# Patient Record
Sex: FEMALE | Race: WHITE | Employment: OTHER | URBAN - METROPOLITAN AREA
[De-identification: names, ages, dates, MRNs, and addresses within clinical notes are randomized per-mention and may not be internally consistent; named-entity substitution may affect disease eponyms.]

---

## 2018-05-12 LAB — MAMMOGRAM: NORMAL

## 2019-05-13 ENCOUNTER — TRANSFERRED RECORDS (OUTPATIENT)
Dept: HEALTH INFORMATION MANAGEMENT | Facility: CLINIC | Age: 69
End: 2019-05-13

## 2019-06-18 ENCOUNTER — TRANSFERRED RECORDS (OUTPATIENT)
Dept: HEALTH INFORMATION MANAGEMENT | Facility: CLINIC | Age: 69
End: 2019-06-18

## 2020-02-06 ENCOUNTER — TRANSCRIBE ORDERS (OUTPATIENT)
Dept: OTHER | Age: 70
End: 2020-02-06

## 2020-02-06 DIAGNOSIS — M50.30 DDD (DEGENERATIVE DISC DISEASE), CERVICAL: Primary | ICD-10-CM

## 2020-02-06 DIAGNOSIS — M54.2 CERVICAL PAIN: ICD-10-CM

## 2020-02-10 ENCOUNTER — TELEPHONE (OUTPATIENT)
Dept: ORTHOPEDICS | Facility: CLINIC | Age: 70
End: 2020-02-10

## 2020-02-10 NOTE — TELEPHONE ENCOUNTER
INTAKE QUESTIONS FOR SPINE AND NECK                                                                     REASON FOR VISIT:DDD (degenerative disc disease), cervical [M50.30];Cervical pain [M54.2] Referred by [facility]     APPOINTMENT DATE: 02/25/2020   HAVE YOU HAD PREVIOUS SURGERIES ON YOUR NECK OR SPINE: No  WHERE? n/a  WHEN? n/a      HAVE YOU HAD RELATED IMAGING?   (BONE SCANS, XRAYS, CAT SCANS, MRIS) MRI    WHERE? [facility]    WHEN? Last week   HAVE YOU HAD INJECTIONS TO THE SPINE? Yes    WHERE? [facility]    WHEN? April   HAVE YOU HAD RELATED PHYSICAL THERAPY IN THE LAST YEAR? Yes    WHERE? [facility] and also dry needle accupunture   DO YOU HAVE ANY RELATED IMPLANTS OR HARDWARE? no   DO YOU HAVE ANY PATHOLOGY REPORTS RELATED TO YOUR SPINE OR NECK? no     CSS NOTES STATUS DETAILS   OFFICE NOTE from referring provider In process    OFFICE NOTE from other specialist In process    PHYSICAL THERAPY (WITHIN LAST YEAR) In process    DISCHARGE SUMMARY from hospital In process    DISCHARGE REPORT from the ER In process    OPERATIVE REPORT In process    MEDICATION LIST In process    LABS/CBC/DIFF In process    CULTURES In process    IMPLANT RECORD/STICKER In process    IMAGING     INJECTIONS DONE IN RADIOLOGY In process    MRI In process    CT SCAN In process    XRAYS (IMAGES & REPORTS) In process    TUMOR     PATHOLOGY  Slides & report In process

## 2020-02-11 ENCOUNTER — DOCUMENTATION ONLY (OUTPATIENT)
Dept: CARE COORDINATION | Facility: CLINIC | Age: 70
End: 2020-02-11

## 2020-02-13 DIAGNOSIS — M54.2 CERVICAL PAIN: Primary | ICD-10-CM

## 2020-02-25 ENCOUNTER — OFFICE VISIT (OUTPATIENT)
Dept: ORTHOPEDICS | Facility: CLINIC | Age: 70
End: 2020-02-25
Attending: PHYSICAL MEDICINE & REHABILITATION
Payer: MEDICARE

## 2020-02-25 ENCOUNTER — ANCILLARY PROCEDURE (OUTPATIENT)
Dept: GENERAL RADIOLOGY | Facility: CLINIC | Age: 70
End: 2020-02-25
Attending: ORTHOPAEDIC SURGERY
Payer: MEDICARE

## 2020-02-25 VITALS — HEIGHT: 62 IN | WEIGHT: 130 LBS | BODY MASS INDEX: 23.92 KG/M2

## 2020-02-25 DIAGNOSIS — M54.2 NECK PAIN: Primary | ICD-10-CM

## 2020-02-25 RX ORDER — ATORVASTATIN CALCIUM 10 MG/1
10 TABLET, FILM COATED ORAL DAILY
COMMUNITY
Start: 2020-01-21

## 2020-02-25 RX ORDER — GABAPENTIN 100 MG/1
100 CAPSULE ORAL 3 TIMES DAILY PRN
Qty: 90 CAPSULE | Refills: 0 | Status: SHIPPED | OUTPATIENT
Start: 2020-02-25 | End: 2020-04-14

## 2020-02-25 RX ORDER — LOVASTATIN 20 MG
TABLET ORAL
COMMUNITY
Start: 2019-10-16 | End: 2020-02-25

## 2020-02-25 RX ORDER — POTASSIUM CHLORIDE 750 MG/1
10 TABLET, EXTENDED RELEASE ORAL DAILY
COMMUNITY
Start: 2020-02-08

## 2020-02-25 ASSESSMENT — ENCOUNTER SYMPTOMS
NECK MASS: 0
LEG PAIN: 0
EXERCISE INTOLERANCE: 0
PALPITATIONS: 0
SORE THROAT: 0
SINUS CONGESTION: 0
SINUS PAIN: 0
SLEEP DISTURBANCES DUE TO BREATHING: 0
HYPERTENSION: 1
ORTHOPNEA: 0
HOARSE VOICE: 0
TROUBLE SWALLOWING: 0
TASTE DISTURBANCE: 0
LIGHT-HEADEDNESS: 0
HYPOTENSION: 0
SMELL DISTURBANCE: 0

## 2020-02-25 ASSESSMENT — MIFFLIN-ST. JEOR: SCORE: 1057.93

## 2020-02-25 NOTE — PROGRESS NOTES
"Spine Surgery Consultation    REFERRING PHYSICIAN: Elza Mauro   PRIMARY CARE PHYSICIAN: So Carroll           Chief Complaint:   Consult (cervical pain )      History of Present Illness:  Symptom Profile Including: location of symptoms, onset, severity, exacerbating/alleviating factors, previous treatments:        Loli Roberson is a 69 year old female who presents today for evaluation of arthritic type neck pain.  She is been dealing with it for about a year and a half.  She denies any radicular or myelopathic complaints.  No issues with the hands or legs.  No bowel or bladder problems.  No gait instability.  She feels pain turning her neck to the side and a cracking and popping in her neck.  She takes some Tylenol for it.  She was offered a muscle relaxant but it made her feel quite sedated so she is not able to continue it.  She is done physical therapy and acupuncture without much relief.         Past Medical History:   No past medical history on file.         Past Surgical History:   No past surgical history on file.         Social History:     Social History     Tobacco Use     Smoking status: Never Smoker     Smokeless tobacco: Never Used   Substance Use Topics     Alcohol use: Not on file            Family History:   No family history on file.         Allergies:   No Known Allergies         Medications:     Current Outpatient Medications   Medication     atorvastatin (LIPITOR) 10 MG tablet     potassium chloride ER (K-TAB/KLOR-CON) 10 MEQ CR tablet     No current facility-administered medications for this visit.              Review of Systems:     A 10 point ROS was performed and reviewed. Specific responses to these questions are noted at the end of the document.         Physical Exam:   Vitals: Ht 1.575 m (5' 2\")   Wt 59 kg (130 lb)   BMI 23.78 kg/m    Constitutional: awake, alert, cooperative, no apparent distress, appears stated age.    Eyes: The sclera are white.  Ears, Nose, Throat: The " trachea is midline.  Psychiatric: The patient has a normal affect.  Respiratory: breathing non-labored  Cardiovascular: The extremities are warm and perfused.  Skin: no obvious rashes or lesions.  Musculoskeletal, Neurologic, and Spine:     Cervical spine:    Appearance -no gross step-offs, kyphosis.    Motor -     C5: Deltoids R 5/5 and L 5/5 strength    C6: Biceps R 5/5 and L 5/5 strength     C7: Triceps R 5/5 and L 5/5 strength     C8:  R 5/5 and L 5/5 strength     T1: Dorsal interossei R 4/5 and L 4/5 strength        Sensation: intact to light touch in C5-T1      Special Tests -      Lhermitte's Test - Negative     Spurling's Test - produces neck pain     Lopez's Test - Negative          Neurologic:      REFLEXES Left Right   Biceps 1+ 1+   Triceps 1+ 1+   Brachioradialis 1+ 1+             Babinski No upgoing great toe No upgoing great toe   Clonus 0 beats 0 beats     Hip Exam:  No pain with hip log roll and no tenderness over the greater trochanters.    Alignment:  Patient stands with a neutral standing sagittal balance.           Imaging:   We ordered and independently reviewed new radiographs at this clinic visit. The results were discussed with the patient.  Findings include:    AP lateral flexion-extension cervical radiographs show multilevel degenerative changes on flexion views there is grade 1 anterolisthesis of C7 on T1 February 4, 2020 cervical MRI again shows multilevel cervical degenerative changes with disc osteophyte complexes resulting in mild to moderate narrowing C3-4 C4-5 C5-6 centrally.  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  She is dealing with severe neck pain but has no neurologic complaints.  There is no sagittal imbalance and no dynamic instability.     Plan:  1. I counseled her that with this type of spondylosis in the neck, with the extent of it, it would require a longer fusion construct to try and address it.  This is a major surgery.  I explained that in the absence of deformity or significant instability that I would not recommend this.  Furthermore she has no neurologic symptoms at this time and I think a long fusion construct has a chance of making her worse rather than better so I have recommended against it at this time.  2. I recommended she try to maximize nonoperative care in the form of facet injections, and that she consider radiofrequency ablations.  She follows with the pain clinic in [place] and she is going to discuss this with them.  I also recommended regular anti-inflammatories and I offered her a prescription for gabapentin to see if that would help her as well.  I explained that this can produce sedation similar to a muscle relaxant so we agreed to start at a very low dose.  She can discuss tapering up on this with the pain clinic over time as long as she does not have side effects.  3. I asked her to contact me in the future if she develops any neurologic symptoms or numbness or clumsiness in the hands.  Or if she has shooting pain down into the arms consistent with radiculopathy.  If this does develop, then perhaps surgery might be a more reasonable consideration, but I encouraged her to make a long fusion for neck pain alone to be a last resort.      Respectfully,  José Ellis MD  Spine Surgery  [facility]      Answers for HPI/ROS submitted by the patient on 2/25/2020   General Symptoms: No  Skin Symptoms: No  HENT Symptoms: Yes  EYE SYMPTOMS: No  HEART SYMPTOMS: Yes  LUNG SYMPTOMS: No  INTESTINAL SYMPTOMS: No  URINARY SYMPTOMS:  No  GYNECOLOGIC SYMPTOMS: No  BREAST SYMPTOMS: No  SKELETAL SYMPTOMS: No  BLOOD SYMPTOMS: No  NERVOUS SYSTEM SYMPTOMS: No  MENTAL HEALTH SYMPTOMS: No  Ear pain: Yes  Ear discharge: Yes  Hearing loss: Yes  Tinnitus: Yes  Nosebleeds: No  Congestion: No  Sinus pain: No  Trouble swallowing: No   Voice hoarseness: No  Mouth sores: No  Sore throat: No  Tooth pain: No  Gum tenderness: No  Bleeding gums: No  Change in taste: No  Change in sense of smell: No  Dry mouth: No  Hearing aid used: No  Neck lump: No  Chest pain or pressure: No  Fast or irregular heartbeat: No  Pain in legs with walking: No  Trouble breathing while lying down: No  Fingers or toes appear blue: No  High blood pressure: Yes  Low blood pressure: No  Murmurs: No  Pacemaker: No  Varicose veins: No  Edema or swelling: No  Wake up at night with shortness of breath: No  Light-headedness: No  Exercise intolerance: No

## 2020-02-25 NOTE — NURSING NOTE
"Reason For Visit:   Chief Complaint   Patient presents with     Consult     cervical pain        Primary MD: So Carroll  Ref. MD: John   Date of surgery: none   Type of surgery: none .  Smoker: No  Request smoking cessation information: No    Ht 1.575 m (5' 2\")   Wt 59 kg (130 lb)   BMI 23.78 kg/m           Oswestry (LUBA) Questionnaire    No flowsheet data found.         Neck Disability Index (NDI) Questionnaire    Neck Disability Index (NDI) 2/25/2020   Neck Disability Index: Count 10   NDI: Total Score = SUM (points for all 10 findings) 22   Neck Disability in Percent = (Total Score) / 50 * 100 44 (%)                   Promis 10 Assessment    PROMIS 10 2/25/2020   In general, would you say your health is: Good   In general, would you say your quality of life is: Good   In general, how would you rate your physical health? Fair   In general, how would you rate your mental health, including your mood and your ability to think? Good   In general, how would you rate your satisfaction with your social activities and relationships? Good   In general, please rate how well you carry out your usual social activities and roles Good   To what extent are you able to carry out your everyday physical activities such as walking, climbing stairs, carrying groceries, or moving a chair? Moderately   How often have you been bothered by emotional problems such as feeling anxious, depressed or irritable? Never   How would you rate your fatigue on average? Mild   How would you rate your pain on average?   0 = No Pain  to  10 = Worst Imaginable Pain 6   In general, would you say your health is: 3   In general, would you say your quality of life is: 3   In general, how would you rate your physical health? 2   In general, how would you rate your mental health, including your mood and your ability to think? 3   In general, how would you rate your satisfaction with your social activities and relationships? 3   In general, " please rate how well you carry out your usual social activities and roles. (This includes activities at home, at work and in your community, and responsibilities as a parent, child, spouse, employee, friend, etc.) 3   To what extent are you able to carry out your everyday physical activities such as walking, climbing stairs, carrying groceries, or moving a chair? 3   In the past 7 days, how often have you been bothered by emotional problems such as feeling anxious, depressed, or irritable? 1   In the past 7 days, how would you rate your fatigue on average? 2   In the past 7 days, how would you rate your pain on average, where 0 means no pain, and 10 means worst imaginable pain? 6   Global Mental Health Score 14   Global Physical Health Score 12   PROMIS TOTAL - SUBSCORES 26                Oscar Zaragoza ATC

## 2020-03-04 ENCOUNTER — TELEPHONE (OUTPATIENT)
Dept: ANESTHESIOLOGY | Facility: CLINIC | Age: 70
End: 2020-03-04

## 2020-03-04 NOTE — TELEPHONE ENCOUNTER
Dr. Porras reviewed this patients referral and determined the pt was HP.   LPN called to offer appointment with the clinic- Pt was unavailable with Dr. Porras on 3/5/20.     Pt assisted to schedule with Dr. Briscoe.     Shellie Verdugo LPN

## 2020-03-05 ASSESSMENT — ENCOUNTER SYMPTOMS
TROUBLE SWALLOWING: 0
NECK MASS: 0
SMELL DISTURBANCE: 0
HOARSE VOICE: 0
TASTE DISTURBANCE: 0
SORE THROAT: 0
SINUS PAIN: 0
SINUS CONGESTION: 0

## 2020-03-05 ASSESSMENT — ANXIETY QUESTIONNAIRES
1. FEELING NERVOUS, ANXIOUS, OR ON EDGE: NOT AT ALL
2. NOT BEING ABLE TO STOP OR CONTROL WORRYING: NOT AT ALL
GAD7 TOTAL SCORE: 0
GAD7 TOTAL SCORE: 0
5. BEING SO RESTLESS THAT IT IS HARD TO SIT STILL: NOT AT ALL
3. WORRYING TOO MUCH ABOUT DIFFERENT THINGS: NOT AT ALL
7. FEELING AFRAID AS IF SOMETHING AWFUL MIGHT HAPPEN: NOT AT ALL
4. TROUBLE RELAXING: NOT AT ALL
6. BECOMING EASILY ANNOYED OR IRRITABLE: NOT AT ALL
7. FEELING AFRAID AS IF SOMETHING AWFUL MIGHT HAPPEN: NOT AT ALL

## 2020-03-06 ENCOUNTER — OFFICE VISIT (OUTPATIENT)
Dept: ANESTHESIOLOGY | Facility: CLINIC | Age: 70
End: 2020-03-06
Payer: MEDICARE

## 2020-03-06 ENCOUNTER — HOSPITAL ENCOUNTER (OUTPATIENT)
Facility: AMBULATORY SURGERY CENTER | Age: 70
End: 2020-03-06
Attending: ANESTHESIOLOGY
Payer: MEDICARE

## 2020-03-06 VITALS
HEIGHT: 62 IN | BODY MASS INDEX: 23.92 KG/M2 | RESPIRATION RATE: 19 BRPM | OXYGEN SATURATION: 98 % | WEIGHT: 130 LBS | HEART RATE: 91 BPM

## 2020-03-06 DIAGNOSIS — M47.812 CERVICAL SPONDYLOSIS: ICD-10-CM

## 2020-03-06 DIAGNOSIS — M79.18 MYOFASCIAL PAIN: Primary | ICD-10-CM

## 2020-03-06 RX ORDER — METHOCARBAMOL 500 MG/1
500 TABLET, FILM COATED ORAL
Qty: 30 TABLET | Refills: 2 | Status: SHIPPED | OUTPATIENT
Start: 2020-03-06 | End: 2020-04-17

## 2020-03-06 RX ORDER — PHENOL 1.4 %
1 AEROSOL, SPRAY (ML) MUCOUS MEMBRANE AT BEDTIME
COMMUNITY

## 2020-03-06 RX ORDER — CHLORAL HYDRATE 500 MG
1 CAPSULE ORAL 2 TIMES DAILY
COMMUNITY

## 2020-03-06 ASSESSMENT — PAIN SCALES - GENERAL: PAINLEVEL: SEVERE PAIN (6)

## 2020-03-06 ASSESSMENT — ANXIETY QUESTIONNAIRES: GAD7 TOTAL SCORE: 0

## 2020-03-06 ASSESSMENT — MIFFLIN-ST. JEOR: SCORE: 1057.93

## 2020-03-06 NOTE — NURSING NOTE
LPN reviewed AVS with Pt.  Pt verbalized an understanding of information, and was asked to contact clinic with questions.    Follow up recommended by provider: As indicated after MBB.    LPN read through the instructions with pt for the recommended procedure: Cervical MBB  Pt verbalized understanding to holding appropriate medication per protocol, and was agreeable to NPO policy and needing a .    Shellie Verdugo, NURIA

## 2020-03-06 NOTE — PROGRESS NOTES
VISIT RECOMMENDATIONS:  - Trial diclofenac gel 1% QID for right cervicothoracic myofascial pain  - Trial methocarbamol 500mg QID PRN for right cervicothoracic myofascial pain  - Referral placed for right cervical 3, 4, 5, and 6 medial branch nerve diagnostic blocks for cervical spondylosis    COMPREHENSIVE PAIN CLINIC INITIAL EVALUATION    I had the pleasure of meeting Ms. Loli Roberson on 3/6/2020 in [facility] in consult for Dr. Smith with regards to her right sided neck pain    Subjective:  The patient is a 69 year old female with past medical history of HTN, HLD, and chronic neck pain who presents for evaluation of right sided neck and shoulder pain.  The patient's pain began about a year ago following physical exertion with moving furniture and has been progressively worse since that time but marginally improved since starting gabapentin.  She reports that her pain is located primarily in the right upper cervical spine and does not radiate.  It does occur sporadically on the left side as well  The patient describes the pain as pressure, horrible, and intermittently sharp.  She reports that the pain is made worse by twisting, jarring movements, side sleeping, and exertion.  Her pain is improved with rest.  She denies any associated numbness, weakness, or other symptoms of the right arm.  She denies any new problems with falls or balance, any new numbness or weakness of the arms or legs, any new bowel or bladder incontinence, any night sweats or unexplained fevers, or any sudden or unexpected weight loss.  The patient's pain is most severe first thing in the morning.  She rates her average pain score at 6/10, but it can be as low as 5/10 or as severe as 9/10.     Patient reported symptoms:  Patient Supplied Answers To the [facility] Pain Questionnaire  [facility] -  Patient Entered Questionnaire/Answers 3/5/2020   What number best describes your pain right now:  0 = No pain  to  10 = Worst pain imaginable 6    How would you describe the pain? sharp, dull, aching   Which of the following worsen your pain? lying down, standing, sitting, walking, exercise   Which of the following improve or reduce your pain?  nothing relieves the pain   What number best describes your average pain for the past week:  0 = No pain  to  10 = Worst pain imaginable 6   What number best describes your LOWEST pain in past 24 hours:  0 = No pain  to  10 = Worst pain imaginable 5   What number best describes your WORST pain in past 24 hours:  0 = No pain  to  10 = Worst pain imaginable 7   When is your pain worst? Constant   What non-medicine treatments have you already had for your pain? physical therapy, acupuncture, TENS (electrical stimulator), spine injections (shots)   Have you tried treating your pain with medication?  Yes   Are you currently taking medications for your pain? Yes             Current treatments:  - Gabapentin 100mg at bedtime     Previous medication treatments included:  Anti-convulsants: Gabapentin, just started  Muscle relaxors: sedating  Anti-depressants: not tried   Acetaminophen/NSAIDs: equivocal  Topicals: not tried    Other treatments have included:  Physical therapy: Completed, non beneficial  Pain Psychology: not tried  Chiropractic: not tried   Acupuncture: non beneficial  TENs Unit: Non beneficial  Injections: Trigger point injections  Surgeries:     Past Medical History:  Medical history reviewed.   No past medical history on file.   Patient Active Problem List   Diagnosis     Neck pain     Hypertension  Hyperlipidemia    Past Surgical History:  Pertinent surgical history reviewed.   No past surgical history on file.   Eye surgery at age 11    Medications: Pertinent medications reviewed and updated  Current Outpatient Medications   Medication Sig Dispense Refill     atorvastatin (LIPITOR) 10 MG tablet Take 10 mg by mouth daily        gabapentin (NEURONTIN) 100 MG capsule Take 1 capsule (100 mg) by mouth 3 times  daily as needed for other (nerve pain, neck pain) (Patient taking differently: Take 100 mg by mouth At Bedtime ) 90 capsule 0     potassium chloride ER (K-TAB/KLOR-CON) 10 MEQ CR tablet Take 10 mEq by mouth daily        fish oil-omega-3 fatty acids 1000 MG capsule Take 1 capsule by mouth 2 times daily       Multiple Vitamins-Minerals (MULTIVITAMIN WOMEN) TABS Take 1 tablet by mouth At Bedtime         [place] and [place] Prescription Monitoring Program reviewed     Allergies: Pertinent allergies reviewed   No Known Allergies    Family History:   CAD, dementia in father  HTN, osteoporosis in mother  DM in [place]    Social history:   Social History     Socioeconomic History     Marital status:      Spouse name: Not on file     Number of children: Not on file     Years of education: Not on file     Highest education level: Not on file   Occupational History     Not on file   Social Needs     Financial resource strain: Not on file     Food insecurity:     Worry: Not on file     Inability: Not on file     Transportation needs:     Medical: Not on file     Non-medical: Not on file   Tobacco Use     Smoking status: Never Smoker     Smokeless tobacco: Never Used   Substance and Sexual Activity     Alcohol use: Not on file     Drug use: Not on file     Sexual activity: Not on file   Lifestyle     Physical activity:     Days per week: Not on file     Minutes per session: Not on file     Stress: Not on file   Relationships     Social connections:     Talks on phone: Not on file     Gets together: Not on file     Attends [employer] service: Not on file     Active member of club or organization: Not on file     Attends meetings of clubs or organizations: Not on file     Relationship status: Not on file     Intimate partner violence:     Fear of current or ex partner: Not on file     Emotionally abused: Not on file     Physically abused: Not on file     Forced sexual activity: Not on file   Other Topics Concern     Not on file   Social  "History Narrative     Not on file     Social History     Social History Narrative     Not on file     She lives in [place], [place]. She is not currently working. She worked previous as an  and retired 3 years ago.  Smoking: Denies. Alcohol: weekly. Street drugs: denies.     Review of Systems:  Review of Systems   Constitutional: Negative.    HENT: Positive for ear discharge, ear pain, hearing loss and tinnitus. Negative for nosebleeds, sinus pain and sore throat.    Eyes: Negative.    Respiratory: Negative.    Cardiovascular: Negative.    Gastrointestinal: Negative.    Genitourinary: Negative.    Musculoskeletal: Negative.    Skin: Negative.    Neurological: Negative.    Endo/Heme/Allergies: Negative.    Psychiatric/Behavioral: Negative.       Physical Exam:  BP (P) 133/89   Pulse 91   Resp 19   Ht 1.575 m (5' 2\")   Wt 59 kg (130 lb)   SpO2 98%   BMI 23.78 kg/m      Physical Exam   Constitutional: She is oriented to person, place, and time.  She appears well-developed and well-nourished. She is not in acute distress.   HENT:     Head: Normocephalic and atraumatic.     Eyes: Pupils are equal, round, and reactive to light. EOM are normal. No scleral icterus.     Neck: Normal range of motion. Neck supple.   Cardiovascular: Normal rate and regular rhythm.   Pulmonary/Chest:  NWOB. No respiratory distress.   Abdominal: deferred  Genitourinary: deferred  Neurological: She is alert and oriented to person, place, and time. CN II-XII grossly intact, coordination grossly normal.  Romberg test negative.  Skin: Skin is warm and dry. She is not diaphoretic.   Psychiatric: She has a normal mood and affect. Her behavior is normal. Judgment and thought content normal.  MSK: Gait is normal    Cervical Spine exam  Posture: Normal    There are tender points identified in the lumbar paraspinal musculature  There are not trigger points identified in the lumbar paraspinal musculature    Range of motion    " Flexion   Normal     Extension   Normal    Rotation   Moderate restriction    Sidebending   Moderate restriction    L'hermitte's sign  Negative         Left  Right  Spurling's test   Negative Negative  ** additional neck pain without arm radiation    Upper Extremity Manual Muscle Testing    Motor (0 to 5 scale):       Right  Left      Shoulder Abd (C5/6)       5                  5    Elbow flexion (C5/6)      5                  5    Wrist extension (C6)      5                  5    Tricep extension (C7) 5  5    Wrist flexion (C7)  5  5    Finger flexion (C8/T1)    5                  5    Finger ABduction (C8/T1)  5             5                                                                   Reflexes (0 to 4 scale):   Right      Left      Biceps (C5)               2            2    Brachioradialis (C6)     2                     2    Tricep (C7)               2  2    Hofmans:    Bilateral Negative    Sensation:    Light touch: intact    Imagin views cervical spine radiographs 2020 12:40 PM     History: Cervical pain      Comparison: MRI 2020     Findings:     AP and lateral views as well as flexion extension views  of the  cervical spine were obtained.     Down to the level of T1 is well visualized on the lateral view(s). The  cervicothoracic junction is  well assessed.     There is no acute osseous abnormality. No prevertebral soft tissue  swelling. Normal cervical lordosis is maintained.   Vertebral body  heights are maintained. There is minimal disc space narrowing at C6-C7  with anterior spondylosis. Multilevel facet arthropathy. Spinal  alignment is maintained without evidence of spondylolisthesis.      No evidence of motion on flexion/extension views.     The visualized lung apices are clear.     Impression:  1.  No acute osseous abnormality.  2.  Multilevel degenerative changes.    Assessment:    The patient is a 69 year old female with PMHx of HTN and HLD who was referred by Dr. Smith for  evaluation of chronic right sided neck and shoulder pain.  Based upon the patient's history, examination, and available imaging, she likely has two primary contributing factors to her neck and shoulder pain which are facet mediated pain related to cervical spondylosis and myofascial pain of the right cervicothoracic region.  It is also possible that inflammation related to facet joint arthropathy is contributing to cervicothoracic myofascial pain.  To address the first problem, a referral was placed for diagnostic cervical medial branch blocks as work up for medial branch nerve ablation.  In the interim, she will likely benefit from an additional multimodal medication regimen which includes topical diclofenac and PRN methocarbamol.  Pending the patient's response to these interventions, additional medications, interventions, or therapies could be considered if indicated    Plan:  1) Cervical spondylosis:  Based on the patient's history, examination, and available imaging, right  cervical spondylosis is likely a significant contributor to her right  neck pain.  For diagnostic purposes, we will refer for right  C3, C4, C5 and C6 medial branch nerve blocks as work up for medial branch nerve radiofrequency ablation.  Pending the response to this, additional medications, interventions, or therapies could be considered if indicated.    Work up:    - Imaging reviewed as noted above, nothing further at this time.    Medications:    - Medication trials for myofascial pain as noted below    Therapies:    - Deferred at this time    Interventions:    - Referral placed for right  C3, C4, C5 and C6 diagnostic medial branch nerve blocks.    2) Myofascial pain: Based on history and examination, this is also likely a significant contributor to the patient's neck and shoulder pain.  This may also be exacerbated by underlying facet inflammation or by guarding or other overuse behaviors related to facet joint pain.  We will start with  additional medication therapy to address this problem while awaiting definitive treatment for cervical spondylosis as noted above.    Work up:    - None at this time    Medications:    - Trial diclofenac 1% QID to right cervicothoracic region    - Trial methocarbamol 500mg QID PRN for myofascial pain    Therapies:    - Deferred due to non response from previous PT    Interventions:    - None at this time, however consider trigger point injection in the future pending response to conservative therapy    Follow up: 6-8 weeks depending on scheduling and outcome from [facility]    Thank you for the consult.    Total time spent was 40 minutes, and more than 50% of face to face time was spent in counseling and/or coordination of care regarding principles of multidisciplinary care, medication management, and therapeutic options.    Ned Arevalo MD    Department of Anesthesiology  Pain Management Division

## 2020-03-06 NOTE — PATIENT INSTRUCTIONS
Medications:    Diclofenac gel prescribed- Apply to the affected area as prescribed.     Methocarbamol- Take 1 tablet (500 mg) by mouth nightly as needed for muscle spasms.    Procedures:    Cervical 3, cervical 4, cervical 5, and cervical 6 medial branch nerve diagnostic blocks    Please call [phone] to schedule, reschedule, or cancel your procedure appointment.   Phones are answered Monday - Friday from 08:00 - 4:30pm.  Leave a voicemail with your name, birth date, and phone number if no one is available to take your call.     Your procedure: Cervical 3, cervical 4, cervical 5, and cervical 6 medial branch nerve diagnostic blocks    On the day of the procedure  1. Arrive 1 hour earlier than your scheduled time, to the [facility]  Address: [address] [place] [id number]  2. Check in on the 5th floor for your procedure    For your diagnostic procedure, please fax in your Pain Diary.  Our fax number is [phone]    If you must reschedule your procedure more than two times, you must follow up in clinic before rescheduling again.    Preparing for your procedure    CAUTION - FAILURE TO FOLLOW THESE PRE-PROCEDURE INSTRUCTIONS WILL RESULT IN YOUR PROCEDURE BEING RESCHEDULED.            You must have a  take you home after your procedure. Transportation by taxi or para-transit is okay as long as you have a responsible adult accompany you. You must provide your 's full name and contact number at time of check in.     Fasting Protocol You may have NOTHING SOLID TO EAT 8 HOURS prior to arrival at the procedure area.     You may have CLEAR LIQUIDS UP TO 2 HOURS prior to arrival.    Broth and candy are considered solid food and require an eight hour fast.     Clear liquids include water, clear fruit juice (no pulp), carbonated beverages, ice, black coffee, black tea, clear jello. No alcohol containing beverages.   Medications If you take any medications, DO NOT STOP. Take your  medications as usual the day of your procedure with a sip of water AT LEAST 2 HOURS PRIOR TO ARRIVAL.    Antibiotics If you are currently taking antibiotics, you must complete the entire dose 7 days prior to your scheduled procedure. You must be clear of any signs or symptoms of infection. If you begin antibiotics, please contact our clinic for instructions.     Fever, Chills, or Rash If you experience a fever of higher than 100 degrees, chills, rash, or open wounds during the one week before your procedure, please call the clinic to see if you may proceed with your procedure.      Medication Hold List  **Patients under Cardiology/Neurology care should consult their provider prior to the pain procedure to verify pre-procedure medication instructions. The information below contains general guidelines.**    Blood Thinners If you are taking daily ASPIRIN, PLAVIX, OR OTHER BLOOD THINNERS SUCH AS COUMADIN/WARFARIN, we will need your prescribing doctor to sign a release permitting you to stop these medications. Once approved by your prescribing doctor - STOP ALL BLOOD THINNERS BASED ON THE TIME TABLE BELOW PRIOR TO YOUR PROCEDURE. If you have been instructed to stop WARFARIN(COUMADIN), you must have an INR lab drawn the day before your procedure. . Your INR must be within normal limits before we can perform your injection. MEDICATIONS CAN BE RESTARTED AFTER YOUR PROCEDURE.    14 DAY HOLD  Ticlid (ticlopidine)    10 DAY HOLD  Effient (Prasugel)    3 DAY HOLD  Xarelto (rivaroxaban) 7 DAY HOLD  Anacin, Bufferin, Ecotrin, Excedrin, Aggrenox (Aspirin)  Brilinta (ticagrelor)  Coumadin (Warfarin)  Pradexa (Dabigatran)  Elmiron (Pentosan)  Plavix (Clopidogrel Bisulfate)  Pletal (Cilostazol)    24 HOUR HOLD  Lovenox (enoxaparin)  Agrylin (Anagrelide)        Non-steroidal Anti-inflammatories (NSAIDs) DO NOT TAKE any non-steroidal anti-inflammatory medications (NSAIDs) listed on the table below. MEDICATIONS CAN BE RESTARTED AFTER  YOUR PROCEDURE. Celebrex is OK to take and does not need to be discontinued.     Medications to stop:  3 DAY HOLD  Advil, Motrin (Ibuprofen)  Arthrotec (diciofenac sodium/misoprostol)  Clinoril (Sulindac)  Indocin (Indomethacin)  Lodine (Etodolac)  Toradol (Ketorolac)  Vicoprofen (Hydrocodone and Ibuprofen)  Voltaren (Diclotenac)    14 DAY HOLD  Daypro (Oxaprozin)  Feldene (Piroxicam)   7 DAY HOLD  Aleve (Naproxen sodium)  Darvon compound (contains aspirin)  Naprosyn (Naproxen)  Norgesic Forte (contains aspirin)  Mobic (Meloxicam)  Oruvall (Ketoprofen)  Percodan (contains aspirin)  Relafen (Nabumetone)  Salsalate  Trilisate  Vitamin E (more than 400 mg per day)  Any medication containing aspirin                To speak with a nurse, schedule/reschedule/cancel a clinic appointment, or request a medication refill call: [phone]     You can also reach us by [email or web address]: [email or web address]

## 2020-03-06 NOTE — LETTER
3/6/2020       RE: Loli Roberson  [address] [facility] [address]     Dear Colleague,    Thank you for referring your patient, Loli Roberson, to the [facility] FOR COMPREHENSIVE PAIN MANAGEMENT at [facility]. Please see a copy of my visit note below.    VISIT RECOMMENDATIONS:  - Trial diclofenac gel 1% QID for right cervicothoracic myofascial pain  - Trial methocarbamol 500mg QID PRN for right cervicothoracic myofascial pain  - Referral placed for right cervical 3, 4, 5, and 6 medial branch nerve diagnostic blocks for cervical spondylosis    COMPREHENSIVE PAIN CLINIC INITIAL EVALUATION    I had the pleasure of meeting Ms. Loli Roberson on 3/6/2020 in the [facility] in consult for Dr. Smith with regards to her right sided neck pain    Subjective:  The patient is a 69 year old female with past medical history of HTN, HLD, and chronic neck pain who presents for evaluation of right sided neck and shoulder pain.  The patient's pain began about a year ago following physical exertion with moving furniture and has been progressively worse since that time but marginally improved since starting gabapentin.  She reports that her pain is located primarily in the right upper cervical spine and does not radiate.  It does occur sporadically on the left side as well  The patient describes the pain as pressure, horrible, and intermittently sharp.  She reports that the pain is made worse by twisting, jarring movements, side sleeping, and exertion.  Her pain is improved with rest.  She denies any associated numbness, weakness, or other symptoms of the right arm.  She denies any new problems with falls or balance, any new numbness or weakness of the arms or legs, any new bowel or bladder incontinence, any night sweats or unexplained fevers, or any sudden or unexpected weight loss.  The patient's pain is most severe first thing in the morning.  She rates her average pain score  at 6/10, but it can be as low as 5/10 or as severe as 9/10.     Patient reported symptoms:  Patient Supplied Answers To the [facility] Pain Questionnaire  [facility] Pain -  Patient Entered Questionnaire/Answers 3/5/2020   What number best describes your pain right now:  0 = No pain  to  10 = Worst pain imaginable 6   How would you describe the pain? sharp, dull, aching   Which of the following worsen your pain? lying down, standing, sitting, walking, exercise   Which of the following improve or reduce your pain?  nothing relieves the pain   What number best describes your average pain for the past week:  0 = No pain  to  10 = Worst pain imaginable 6   What number best describes your LOWEST pain in past 24 hours:  0 = No pain  to  10 = Worst pain imaginable 5   What number best describes your WORST pain in past 24 hours:  0 = No pain  to  10 = Worst pain imaginable 7   When is your pain worst? Constant   What non-medicine treatments have you already had for your pain? physical therapy, acupuncture, TENS (electrical stimulator), spine injections (shots)   Have you tried treating your pain with medication?  Yes   Are you currently taking medications for your pain? Yes             Current treatments:  - Gabapentin 100mg at bedtime     Previous medication treatments included:  Anti-convulsants: Gabapentin, just started  Muscle relaxors: sedating  Anti-depressants: not tried   Acetaminophen/NSAIDs: equivocal  Topicals: not tried    Other treatments have included:  Physical therapy: Completed, non beneficial  Pain Psychology: not tried  Chiropractic: not tried   Acupuncture: non beneficial  TENs Unit: Non beneficial  Injections: Trigger point injections  Surgeries:     Past Medical History:  Medical history reviewed.   No past medical history on file.   Patient Active Problem List   Diagnosis     Neck pain     Hypertension  Hyperlipidemia    Past Surgical History:  Pertinent surgical history reviewed.   No past surgical history on file.    Eye surgery at age 11    Medications: Pertinent medications reviewed and updated  Current Outpatient Medications   Medication Sig Dispense Refill     atorvastatin (LIPITOR) 10 MG tablet Take 10 mg by mouth daily        gabapentin (NEURONTIN) 100 MG capsule Take 1 capsule (100 mg) by mouth 3 times daily as needed for other (nerve pain, neck pain) (Patient taking differently: Take 100 mg by mouth At Bedtime ) 90 capsule 0     potassium chloride ER (K-TAB/KLOR-CON) 10 MEQ CR tablet Take 10 mEq by mouth daily        fish oil-omega-3 fatty acids 1000 MG capsule Take 1 capsule by mouth 2 times daily       Multiple Vitamins-Minerals (MULTIVITAMIN WOMEN) TABS Take 1 tablet by mouth At Bedtime         [place] and [place] Prescription Monitoring Program reviewed     Allergies: Pertinent allergies reviewed   No Known Allergies    Family History:   CAD, dementia in father  HTN, osteoporosis in mother  DM in [place]    Social history:   Social History     Socioeconomic History     Marital status:      Spouse name: Not on file     Number of children: Not on file     Years of education: Not on file     Highest education level: Not on file   Occupational History     Not on file   Social Needs     Financial resource strain: Not on file     Food insecurity:     Worry: Not on file     Inability: Not on file     Transportation needs:     Medical: Not on file     Non-medical: Not on file   Tobacco Use     Smoking status: Never Smoker     Smokeless tobacco: Never Used   Substance and Sexual Activity     Alcohol use: Not on file     Drug use: Not on file     Sexual activity: Not on file   Lifestyle     Physical activity:     Days per week: Not on file     Minutes per session: Not on file     Stress: Not on file   Relationships     Social connections:     Talks on phone: Not on file     Gets together: Not on file     Attends Mu-ism service: Not on file     Active member of club or organization: Not on file     Attends meetings of clubs or  "organizations: Not on file     Relationship status: Not on file     Intimate partner violence:     Fear of current or ex partner: Not on file     Emotionally abused: Not on file     Physically abused: Not on file     Forced sexual activity: Not on file   Other Topics Concern     Not on file   Social History Narrative     Not on file     Social History     Social History Narrative     Not on file     She lives in [place], [place]. She is not currently working. She worked previous as an  and retired 3 years ago.  Smoking: Denies. Alcohol: weekly. Street drugs: denies.     Review of Systems:  Review of Systems   Constitutional: Negative.    HENT: Positive for ear discharge, ear pain, hearing loss and tinnitus. Negative for nosebleeds, sinus pain and sore throat.    Eyes: Negative.    Respiratory: Negative.    Cardiovascular: Negative.    Gastrointestinal: Negative.    Genitourinary: Negative.    Musculoskeletal: Negative.    Skin: Negative.    Neurological: Negative.    Endo/Heme/Allergies: Negative.    Psychiatric/Behavioral: Negative.       Physical Exam:  BP (P) 133/89   Pulse 91   Resp 19   Ht 1.575 m (5' 2\")   Wt 59 kg (130 lb)   SpO2 98%   BMI 23.78 kg/m      Physical Exam   Constitutional: She is oriented to person, place, and time.  She appears well-developed and well-nourished. She is not in acute distress.   HENT:     Head: Normocephalic and atraumatic.     Eyes: Pupils are equal, round, and reactive to light. EOM are normal. No scleral icterus.     Neck: Normal range of motion. Neck supple.   Cardiovascular: Normal rate and regular rhythm.   Pulmonary/Chest:  NWOB. No respiratory distress.   Abdominal: deferred  Genitourinary: deferred  Neurological: She is alert and oriented to person, place, and time. CN II-XII grossly intact, coordination grossly normal.  Romberg test negative.  Skin: Skin is warm and dry. She is not diaphoretic.   Psychiatric: She has a normal mood and affect. Her " behavior is normal. Judgment and thought content normal.  MSK: Gait is normal    Cervical Spine exam  Posture: Normal    There are tender points identified in the lumbar paraspinal musculature  There are not trigger points identified in the lumbar paraspinal musculature    Range of motion    Flexion   Normal     Extension   Normal    Rotation   Moderate restriction    Sidebending   Moderate restriction    L'hermitte's sign  Negative         Left  Right  Spurling's test   Negative Negative  ** additional neck pain without arm radiation    Upper Extremity Manual Muscle Testing    Motor (0 to 5 scale):       Right  Left      Shoulder Abd (C5/6)       5                  5    Elbow flexion (C5/6)      5                  5    Wrist extension (C6)      5                  5    Tricep extension (C7) 5  5    Wrist flexion (C7)  5  5    Finger flexion (C8/T1)    5                  5    Finger ABduction (C8/T1)  5             5                                                                   Reflexes (0 to 4 scale):   Right      Left      Biceps (C5)               2            2    Brachioradialis (C6)     2                     2    Tricep (C7)               2  2    Hofmans:    Bilateral Negative    Sensation:    Light touch: intact    Imagin views cervical spine radiographs 2020 12:40 PM     History: Cervical pain      Comparison: MRI 2020     Findings:     AP and lateral views as well as flexion extension views  of the  cervical spine were obtained.     Down to the level of T1 is well visualized on the lateral view(s). The  cervicothoracic junction is  well assessed.     There is no acute osseous abnormality. No prevertebral soft tissue  swelling. Normal cervical lordosis is maintained.   Vertebral body  heights are maintained. There is minimal disc space narrowing at C6-C7  with anterior spondylosis. Multilevel facet arthropathy. Spinal  alignment is maintained without evidence of spondylolisthesis.      No  evidence of motion on flexion/extension views.     The visualized lung apices are clear.     Impression:  1.  No acute osseous abnormality.  2.  Multilevel degenerative changes.    Assessment:    The patient is a 69 year old female with PMHx of HTN and HLD who was referred by Dr. Smith for evaluation of chronic right sided neck and shoulder pain.  Based upon the patient's history, examination, and available imaging, she likely has two primary contributing factors to her neck and shoulder pain which are facet mediated pain related to cervical spondylosis and myofascial pain of the right cervicothoracic region.  It is also possible that inflammation related to facet joint arthropathy is contributing to cervicothoracic myofascial pain.  To address the first problem, a referral was placed for diagnostic cervical medial branch blocks as work up for medial branch nerve ablation.  In the interim, she will likely benefit from an additional multimodal medication regimen which includes topical diclofenac and PRN methocarbamol.  Pending the patient's response to these interventions, additional medications, interventions, or therapies could be considered if indicated    Plan:  1) Cervical spondylosis:  Based on the patient's history, examination, and available imaging, right  cervical spondylosis is likely a significant contributor to her right  neck pain.  For diagnostic purposes, we will refer for right  C3, C4, C5 and C6 medial branch nerve blocks as work up for medial branch nerve radiofrequency ablation.  Pending the response to this, additional medications, interventions, or therapies could be considered if indicated.    Work up:    - Imaging reviewed as noted above, nothing further at this time.    Medications:    - Medication trials for myofascial pain as noted below    Therapies:    - Deferred at this time    Interventions:    - Referral placed for right  C3, C4, C5 and C6 diagnostic medial branch nerve blocks.    2)  Myofascial pain: Based on history and examination, this is also likely a significant contributor to the patient's neck and shoulder pain.  This may also be exacerbated by underlying facet inflammation or by guarding or other overuse behaviors related to facet joint pain.  We will start with additional medication therapy to address this problem while awaiting definitive treatment for cervical spondylosis as noted above.    Work up:    - None at this time    Medications:    - Trial diclofenac 1% QID to right cervicothoracic region    - Trial methocarbamol 500mg QID PRN for myofascial pain    Therapies:    - Deferred due to non response from previous PT    Interventions:    - None at this time, however consider trigger point injection in the future pending response to conservative therapy    Follow up: 6-8 weeks depending on scheduling and outcome from MBB    Thank you for the consult.    Total time spent was 40 minutes, and more than 50% of face to face time was spent in counseling and/or coordination of care regarding principles of multidisciplinary care, medication management, and therapeutic options.    Ned Arevalo MD    Department of Anesthesiology  Pain Management Division

## 2020-03-11 ENCOUNTER — HEALTH MAINTENANCE LETTER (OUTPATIENT)
Age: 70
End: 2020-03-11

## 2020-03-20 ASSESSMENT — ENCOUNTER SYMPTOMS
SORE THROAT: 0
GASTROINTESTINAL NEGATIVE: 1
CARDIOVASCULAR NEGATIVE: 1
EYES NEGATIVE: 1
NEUROLOGICAL NEGATIVE: 1
MUSCULOSKELETAL NEGATIVE: 1
PSYCHIATRIC NEGATIVE: 1
CONSTITUTIONAL NEGATIVE: 1
SINUS PAIN: 0
RESPIRATORY NEGATIVE: 1

## 2020-04-10 ENCOUNTER — TELEPHONE (OUTPATIENT)
Dept: ANESTHESIOLOGY | Facility: CLINIC | Age: 70
End: 2020-04-10

## 2020-04-10 DIAGNOSIS — M54.2 NECK PAIN: ICD-10-CM

## 2020-04-10 NOTE — TELEPHONE ENCOUNTER
[facility] Call Center    Phone Message    May a detailed message be left on voicemail: yes     Reason for Call: Other:      Pt would like her Gabapentin refilled for 100 mg , she would like a call back to discuss if there is any questions as her other Dr. prescribed this medication and she has to have it refilled by [facility] Dr. Pharmacy she uses is [facility] in [facility] pharmacy . Filled by 04/10/2020.      Action Taken: Message routed to:  [facility] ([facility] Pain    Travel Screening: Not Applicable

## 2020-04-14 RX ORDER — GABAPENTIN 100 MG/1
100 CAPSULE ORAL 3 TIMES DAILY PRN
Qty: 90 CAPSULE | Refills: 0 | Status: SHIPPED | OUTPATIENT
Start: 2020-04-14 | End: 2020-05-15

## 2020-04-14 NOTE — TELEPHONE ENCOUNTER
I called and LVM for the pt informing her that we did send in refills of her Gabapentin to the [facility] in [place], [place].    If you have any other questions or concerns you can call us back at [phone].    Rubina Stephens, [facility]

## 2020-04-14 NOTE — TELEPHONE ENCOUNTER
I called and spoke with the pt and scheduled her on 04/17 with Dr. Briscoe. Pt needs Gabapentin refilled and send to the [facility] in [place], [place] since she is helping take care of her mother right now.    Pt takes 100mg BID currently.    Rubian Stephens, CMA

## 2020-04-16 ENCOUNTER — TELEPHONE (OUTPATIENT)
Dept: ANESTHESIOLOGY | Facility: CLINIC | Age: 70
End: 2020-04-16

## 2020-04-16 NOTE — TELEPHONE ENCOUNTER
Voice message for pt.  Pt advised that procedure appointment 4/17/20 is cancelled due to COVID-19.  Pt has virtual clinic appointment 4/17/20, at which time we can discuss the plan for rescheduled procedure. Clinic number left for pt to call back if needed.     Teresita Perdomo, RN, BSN

## 2020-04-17 ENCOUNTER — VIRTUAL VISIT (OUTPATIENT)
Dept: ANESTHESIOLOGY | Facility: CLINIC | Age: 70
End: 2020-04-17
Payer: MEDICARE

## 2020-04-17 DIAGNOSIS — M79.18 MYOFASCIAL PAIN: ICD-10-CM

## 2020-04-17 RX ORDER — METHOCARBAMOL 500 MG/1
500 TABLET, FILM COATED ORAL
Qty: 30 TABLET | Refills: 2 | Status: SHIPPED | OUTPATIENT
Start: 2020-04-17 | End: 2020-11-06

## 2020-04-17 ASSESSMENT — PAIN SCALES - GENERAL: PAINLEVEL: MODERATE PAIN (5)

## 2020-04-17 NOTE — PROGRESS NOTES
"Loli Roberson is a 69 year old female who is being evaluated via a billable telephone visit.      The patient has been notified of following:     \"This telephone visit will be conducted via a call between you and your physician/provider. We have found that certain health care needs can be provided without the need for a physical exam.  This service lets us provide the care you need with a short phone conversation.  If a prescription is necessary we can send it directly to your pharmacy.  If lab work is needed we can place an order for that and you can then stop by our lab to have the test done at a later time.    Telephone visits are billed at different rates depending on your insurance coverage. During this emergency period, for some insurers they may be billed the same as an in-person visit.  Please reach out to your insurance provider with any questions.    If during the course of the call the physician/provider feels a telephone visit is not appropriate, you will not be charged for this service.\"    Patient has given verbal consent for Telephone visit?  Yes    How would you like to obtain your AVS? Chace    Additional provider notes:     COMPREHENSIVE PAIN CLINIC FOLLOW UP EVALUATION  04/17/20  VISIT RECOMMENDATIONS:  - Continue diclofenac 1% QID  - Continue methocarbamol 500 mg at bedtime  - Will schedule for right C3, C4, C5, and C6 MBB once the procedure center is open    Interval History:  The patient is a 69 year old female with a PMHx of HTN, HLD, and chronic neck pain who presents via telephone today for continued evaluation of right neck and shoulder pain.  Since her last visit, the patient' reports:  - Medial branch nerve blocks ordered at her last visit were unable to be completed due to coronavirus outbreak  - She has variable pain with some days being better than others  - Diclofenac gel has been significant beneficial  - She is currently taking gabapentin twice daily without side effects  - She " thinks methocarbamol is helpful in the evening  - With the adjustments made at her previous evaluation she reports her pain is more like discomfort, but is generally tolerable, although it is worst at night  - She is still interested in pursuing medial branch blocks when these are available    Previous Interval History on 03/06/20:   The patient is a 69 year old female with past medical history of HTN, HLD, and chronic neck pain who presents for evaluation of right sided neck and shoulder pain.  The patient's pain began about a year ago following physical exertion with moving furniture and has been progressively worse since that time but marginally improved since starting gabapentin.  She reports that her pain is located primarily in the right upper cervical spine and does not radiate.  It does occur sporadically on the left side as well  The patient describes the pain as pressure, horrible, and intermittently sharp.  She reports that the pain is made worse by twisting, jarring movements, side sleeping, and exertion.  Her pain is improved with rest.  She denies any associated numbness, weakness, or other symptoms of the right arm.  She denies any new problems with falls or balance, any new numbness or weakness of the arms or legs, any new bowel or bladder incontinence, any night sweats or unexplained fevers, or any sudden or unexpected weight loss.  The patient's pain is most severe first thing in the morning.  She rates her average pain score at 6/10, but it can be as low as 5/10 or as severe as 9/10.     Previous recommendations from visit on 03/06/20 include:  - Trial diclofenac gel 1% QID for right cervicothoracic myofascial pain  - Trial methocarbamol 500mg QID PRN for right cervicothoracic myofascial pain  - Referral placed for right cervical 3, 4, 5, and 6 medial branch nerve diagnostic blocks for cervical spondylosis    Current Treatments:  - Gabapentin 100mg BID  - Diclofenac 1% QID for right neck and  shoulder  - Methocarbamol 500mg QHS    [place] Board of Pharmacy Data Base Reviewed:    YES; no concerns    Allergies reviewed:   No Known Allergies    Medications reviewed: Pertinent medications reviewed and updated  Current Outpatient Medications   Medication     atorvastatin (LIPITOR) 10 MG tablet     fish oil-omega-3 fatty acids 1000 MG capsule     gabapentin (NEURONTIN) 100 MG capsule     Multiple Vitamins-Minerals (MULTIVITAMIN WOMEN) TABS     potassium chloride ER (K-TAB/KLOR-CON) 10 MEQ CR tablet     No current facility-administered medications for this visit.      Medical history reviewed:   Patient Active Problem List   Diagnosis     Neck pain     Cervical spondylosis     Review of Systems:  The 14 system ROS was reviewed from the intake questionnaire; results listed at end of note.    Physical Exam:  There were no vitals taken for this visit.    Physical Exam   Constitutional: Patient is oriented to person, place, and time.  No distress.   Psychiatric: she has a normal mood and affect. her behavior is normal. Judgment and thought content normal.    Imaging:  No new pertinent imaging since the patient's last visit available for review    Assessment:    The patient is a 69 year old female with PMHx of HTN, HLD, and chronic neck pain who presents today for continued management of right sided neck and shoulder pain.  She has had a positive response to initial multimodal medication trials with topical diclofenac and methocarbamol with some global improvement in pain and function.  She continues to have some ongoing pain likely related to underlying cervical facet mediated pain and cervical spondylosis.  We will continue her current medication regimen and plan for diagnostic cervical medial branch blocks to address underlying pain related to facet arthropathy once the procedure center open for procedures.    Plan:  1) Cervical spondylosis:  Based on the patient's history, examination, and available imaging,  right cervical spondylosis is likely a significant contributor to her right neck pain.  She was previously referred for C3-6 diagnostic MBB and we will continue to plan for this procedure once it is able to be completed.  Pending the response to this, additional medications, interventions, or therapies could be considered if indicated.     Work up:    - None at this time     Medications:    - Continue current medication regimen as noted above     Therapies:    - Deferred at this time     Interventions:    - Plan for C3, C4, C5 and C6 diagnostic medial branch nerve blocks when able to do so.     2) Myofascial pain: Based on history and examination, this is also likely a significant contributor to the patient's neck and shoulder pain and she has had improvement with topical diclofenac and methocarbamol as trialed at her last visit.  Will continue these therapies for the current time and anticipate continued improvement once underlying spondylosis pain is able to be addressed     Work up:    - None at this time     Medications:    - Continue current medication regimen as above     Therapies:    - Deferred due to non response from previous PT     Interventions:    - None at this time, however consider trigger point injection in the future pending response to conservative therapy     Follow up: 6-8 weeks depending on scheduling and outcome from MBB    Phone call duration: 12 minutes    Ned Arevalo MD    Department of Anesthesiology  Pain Management Division

## 2020-04-17 NOTE — PROGRESS NOTES
"Loli Roberson is a 69 year old female who is being evaluated via a billable telephone visit.      The patient has been notified of following:     \"This telephone visit will be conducted via a call between you and your physician/provider. We have found that certain health care needs can be provided without the need for a physical exam.  This service lets us provide the care you need with a short phone conversation.  If a prescription is necessary we can send it directly to your pharmacy.  If lab work is needed we can place an order for that and you can then stop by our lab to have the test done at a later time.    Telephone visits are billed at different rates depending on your insurance coverage. During this emergency period, for some insurers they may be billed the same as an in-person visit.  Please reach out to your insurance provider with any questions.    If during the course of the call the physician/provider feels a telephone visit is not appropriate, you will not be charged for this service.\"    Patient has given verbal consent for Telephone visit?  Yes    How would you like to obtain your AVS? Chace Stephens CMA        "

## 2020-04-22 NOTE — PATIENT INSTRUCTIONS
Medications:    Continue Methocarbamol and Diclofenac gel as prescribed.     Procedures:    We will contact you when our procedure center re-opens, and assist you to schedule your recommended procedure.   (Cervical Medial Branch Blocks)  -Please call our office if you have additional questions in the interim.        Recommended Follow up:  As indicated after the procedure, or sooner if needed.         To speak with a nurse, schedule/reschedule/cancel a clinic appointment, or request a medication refill call: [phone]    You can also reach us by [facility]: [email or web address]    Please provide the clinic with a minium of 1 week notice, on all prescription refills.

## 2020-05-15 DIAGNOSIS — M54.2 NECK PAIN: ICD-10-CM

## 2020-05-15 RX ORDER — GABAPENTIN 100 MG/1
100 CAPSULE ORAL 3 TIMES DAILY PRN
Qty: 90 CAPSULE | Refills: 1 | Status: SHIPPED | OUTPATIENT
Start: 2020-05-15 | End: 2020-08-21

## 2020-06-15 DIAGNOSIS — Z11.59 ENCOUNTER FOR SCREENING FOR OTHER VIRAL DISEASES: Primary | ICD-10-CM

## 2020-06-23 DIAGNOSIS — Z11.59 ENCOUNTER FOR SCREENING FOR OTHER VIRAL DISEASES: ICD-10-CM

## 2020-06-23 LAB
SARS-COV-2 RNA SPEC QL NAA+PROBE: NOT DETECTED
SPECIMEN SOURCE: NORMAL

## 2020-06-23 PROCEDURE — U0003 INFECTIOUS AGENT DETECTION BY NUCLEIC ACID (DNA OR RNA); SEVERE ACUTE RESPIRATORY SYNDROME CORONAVIRUS 2 (SARS-COV-2) (CORONAVIRUS DISEASE [COVID-19]), AMPLIFIED PROBE TECHNIQUE, MAKING USE OF HIGH THROUGHPUT TECHNOLOGIES AS DESCRIBED BY CMS-2020-01-R: HCPCS | Performed by: ANESTHESIOLOGY

## 2020-06-26 ENCOUNTER — ANCILLARY PROCEDURE (OUTPATIENT)
Dept: RADIOLOGY | Facility: AMBULATORY SURGERY CENTER | Age: 70
End: 2020-06-26
Attending: ANESTHESIOLOGY
Payer: MEDICARE

## 2020-06-26 ENCOUNTER — HOSPITAL ENCOUNTER (OUTPATIENT)
Facility: AMBULATORY SURGERY CENTER | Age: 70
End: 2020-06-26
Attending: ANESTHESIOLOGY
Payer: MEDICARE

## 2020-06-26 VITALS
DIASTOLIC BLOOD PRESSURE: 80 MMHG | OXYGEN SATURATION: 95 % | HEIGHT: 62 IN | TEMPERATURE: 97.9 F | WEIGHT: 130 LBS | BODY MASS INDEX: 23.92 KG/M2 | RESPIRATION RATE: 14 BRPM | SYSTOLIC BLOOD PRESSURE: 110 MMHG

## 2020-06-26 DIAGNOSIS — M47.812 CERVICAL SPONDYLOSIS: ICD-10-CM

## 2020-06-26 DIAGNOSIS — R52 PAIN: ICD-10-CM

## 2020-06-26 RX ORDER — IOPAMIDOL 408 MG/ML
INJECTION, SOLUTION INTRATHECAL PRN
Status: DISCONTINUED | OUTPATIENT
Start: 2020-06-26 | End: 2020-06-26 | Stop reason: HOSPADM

## 2020-06-26 RX ORDER — BUPIVACAINE HYDROCHLORIDE 2.5 MG/ML
INJECTION, SOLUTION INFILTRATION; PERINEURAL PRN
Status: DISCONTINUED | OUTPATIENT
Start: 2020-06-26 | End: 2020-06-26 | Stop reason: HOSPADM

## 2020-06-26 RX ORDER — HYDROCHLOROTHIAZIDE 25 MG/1
25 TABLET ORAL DAILY
COMMUNITY

## 2020-06-26 RX ORDER — LIDOCAINE HYDROCHLORIDE 10 MG/ML
INJECTION, SOLUTION EPIDURAL; INFILTRATION; INTRACAUDAL; PERINEURAL PRN
Status: DISCONTINUED | OUTPATIENT
Start: 2020-06-26 | End: 2020-06-26 | Stop reason: HOSPADM

## 2020-06-26 ASSESSMENT — MIFFLIN-ST. JEOR: SCORE: 1057.93

## 2020-06-26 NOTE — DISCHARGE INSTRUCTIONS
Home Care Instructions after a Medial Branch Block      In a medial branch block, a local anesthetic (numbing medicine) is injected near the medial branch nerve. This stops the transmission of pain signals from the facet joint. If this reduces your pain and improves your mobility, it may tell the doctor which facet joint is causing the pain. This procedure is a diagnostic procedure and is typically short lasting. With this injection, a steroid to increase the longevity of the blocks effect may or may not be used.    Activity  -You may resume most normal activity levels with the exception of strenuous activity. It is important for us to know if your pain with normal activity is relieved after this injection.  -DO NOT shower for 24 hours  -DO NOT remove bandaid for 24 hours    Pain  -You may experience soreness at the injection site for one or two days  -You may use an ice pack for 20 minutes every 2 hours for the first 24 hours  -You may use a heating pad after the first 24 hours  -You may use Tylenol (acetaminophen) every 4 hours or other pain medicines as     directed by your physician    You may experience numbness radiating into your legs or arms (depending on the procedure location). This numbness may last several hours. Until sensation returns to normal; please use caution in walking, climbing stairs, and stepping out of your vehicle, etc.    DID YOU RECEIVE SEDATION TODAY?  No    If you received sedation please follow these additional safety measures.  Sedation medicine, if given, may remain active for many hours. It is important for the next 24 hours that you do not:  -Drive a car  -Operate machines or power tools  -Consume alcohol, including beer  -Sign any important papers or legal documents    DID YOU RECEIVE STEROIDS TODAY?  No    Common side effects of steroids:  Not everyone will experience corticosteroid side effects. If side effects are experienced, they will gradually subside in the 7-10 day period  following an injection. Most common side effects include:  -Flushed face and/or chest  -Feeling of warmth, particularly in the face but could be an overall feeling of warmth  -Increased blood sugar in diabetic patients  -Menstrual irregularities my occur. If taking hormone-based birth control an alternate method of birth control is recommended  -Sleep disturbances and/or mood swings are possible  -Leg cramps      PLEASE KEEP TRACK OF YOUR SYMPTOMS AND NOTE YOUR IMPROVEMENT FOR YOUR DOCTOR.       Fill out the pain diary and fax it back to us. This cues us to call the patient to follow up with scheduling the next procedure. They do not need to call us they have faxed the Pain Diary.     If they do not have access to a fax machine, they can attach it to [employer] and we will follow up with them. They do not need to call us.     If they cannot fax or [employer], then call our call center and request to speak with a nurse for follow up after a procedure [phone].    Please contact us if you have:  -Severe pain  -Fever more than 101.5 degrees Fahrenheit  -Signs of infection at the injection site (redness, swelling, or drainage)    If you have questions, please contact our office at [phone] between the hours of 7:00 am and 3:00 pm Monday through Friday. After office hours you can contact the on call provider by dialing [phone]. If you need immediate attention, we recommend that you go to a hospital emergency room or dial [phone].

## 2020-06-29 ENCOUNTER — MYC MEDICAL ADVICE (OUTPATIENT)
Dept: ANESTHESIOLOGY | Facility: CLINIC | Age: 70
End: 2020-06-29

## 2020-06-29 NOTE — PROCEDURES
Patient: Loli Roberson Age: 69 year old   MRN: [id number] Attending: Dr. Arevalo     Date of Visit: June 26, 2020    PAIN MEDICINE CLINIC PROCEDURE NOTE    ATTENDING CLINICIAN:    Ned Arevalo MD    ASSISTANT CLINICIAN:  King Deng MD (pain medicine fellow)    PREPROCEDURE DIAGNOSES:  1.  RightCervical facet arthropathy   2.  Cervicalgia  3.  Neck pain       POSTPROCEDURE DIAGNOSES:  1.  RightCervical facet arthropathy   2.  Cervicalgia  3.  Neck pain       PROCEDURE(S) PERFORMED:  1.  Right C3, C4, C5 and C6  2.  Fluoroscopic guidance for the above procedures    ANESTHESIA:  Local.    COMPLICATIONS:  None.    MEDICATIONS ADMINISTERED:  Bupivacaine 0.25% 1.6 mL (0.4 mL/level)  Contrast 1 mL  (9 mL wasted)    INDICATIONS:  Loli Roberson is a 69 year old female with a history of .  The patient stated that she was in her usual state of health and denied recent anticoagulant use or recent infections.  Therefore, the plan is to perform above mentioned procedures.     Procedure Details:  Written informed consent was obtained and saved in the electronic medical record, after the risks, benefits, and alternatives were discussed with the patient.  All of the patient s questions were answered.  The patient was brought to the procedure room, where she was identified by name, medical record number and date of birth.      The patient was placed in the left lateral decubitus position on the procedure room table.  All pressure points were checked and comfortably padded.  Routine monitors were placed.  Vital signs were stable.  A formal time-out procedure was performed, as per protocol, including patient name, title of procedure, and site of procedure, and all in the room concurred.    A chlorhexidine prep was completed followed by sterile draping per standard procedure.    Using C-arm lateral fluoroscopy we identified the C3, C4, C5 and C6 articular pillars on the right. The skin overlying the center of these articular  pillars were marked. Then the proposed injection tracts were anesthetized with 1% lidocaine using a 1-1/2 inch 25-gauge needle.We then introduced a 25-gauge 2 inch spinal needles under fluoroscopic guidance in a coaxial orientation at each of the above listed articular pillars until periosteum was encountered. Final needle position was at center of the trapezoid of the target facet on lateral radiographs and the middle of waist of articular pillars in AP radiographs. After a negative aspirate to make sure that there was no intravascular placement, Isovue was then injected to confirm no vascular runoff.   No parasthesia was elicited with needle placement.  Aspiration was negative for CSF or blood. Then, 0.4 mL of the above listed injectate was injected.  A contralateral procedure WAS NOT performed.    Light pressure was held at the puncture site(s) to prevent ecchymosis and oozing.  The patient's skin was cleansed, and hemostasis was confirmed.  Band-aids were applied to the needle injection site(s).      Condition:    The patient remained awake and alert throughout the procedure.  The patient tolerated the procedure well and was monitored for approximately 15 minutes afterward in the post procedure area.  There were no immediate post procedure complications noted.  The patient was then discharged to home as per protocol.  The patient will follow up in the outpatient clinic in 4 week(s), unless otherwise clinically indicated.    Patient condition  stable.    Pre-procedure pain score: 7/10  Post-procedure pain score: 0/10    Ned Arevalo MD    Department of Anesthesiology  Pain Management Division

## 2020-06-29 NOTE — H&P
"Abbreviated History and Physical    Patient Name: Loli Roberson  YOB: 1950    Reason for Procedure: Cervical spondylosis    History: Loli Roberson is a 69 year old year old female who presents today for right cervical 3, 4, 5, and 6 medial branch nerve diagnostic blocks.  She has a history of cervical spondylosis for which the aforementioned procedure will likely provide significant diagnostic value.  We discussed the procedure at length, including the risks, benefits, and alternatives, and she wishes to proceed with the procedure.  She denies any recent anticoagulant use, recent steroid exposure, recent or upcoming surgeries, or recent signs of infection or systemic illness.    Past Medical History:   Diagnosis Date     Hypertension        History of obstructive sleep apnea? NA    History of problems with sedation? NA    Physical exam:  /80   Temp 97.9  F (36.6  C) (Temporal)   Resp 14   Ht 1.575 m (5' 2\")   Wt 59 kg (130 lb)   SpO2 95%   BMI 23.78 kg/m    General: in no apparent distress   Neuro: At least antigravity strength noted in all 4 extremities  Respiratory: Clear to ausculation bilaterally   Cardiac: Regular rate and rhythm  Skin: No rashes or lesions on exposed areas of skin    Medications:   Current Outpatient Medications   Medication     atorvastatin (LIPITOR) 10 MG tablet     diclofenac (VOLTAREN) 1 % topical gel     fish oil-omega-3 fatty acids 1000 MG capsule     gabapentin (NEURONTIN) 100 MG capsule     hydrochlorothiazide (HYDRODIURIL) 25 MG tablet     Multiple Vitamins-Minerals (MULTIVITAMIN WOMEN) TABS     potassium chloride ER (K-TAB/KLOR-CON) 10 MEQ CR tablet     methocarbamol (ROBAXIN) 500 MG tablet     No current facility-administered medications for this encounter.        Allergies:   No Known Allergies    ASA Classification: 2    OK for local anesthetic use.     Ned Arevalo MD    Department of Anesthesiology  Pain Management Division  "

## 2020-07-29 NOTE — TELEPHONE ENCOUNTER
Pain diary printed and will be scanned into the chart. Dr. Briscoe will be updated of result and pt will be contacted to assist to schedule next block if indicated.   RNCC was given the pain diary.   Shellie Verdugo LPN

## 2020-08-05 ENCOUNTER — TELEPHONE (OUTPATIENT)
Dept: ANESTHESIOLOGY | Facility: CLINIC | Age: 70
End: 2020-08-05

## 2020-08-05 DIAGNOSIS — M47.812 CERVICAL SPONDYLOSIS: Primary | ICD-10-CM

## 2020-08-05 NOTE — TELEPHONE ENCOUNTER
LPN called and reviewed the pre-procedure instructions with the pt.   Pt verbalized understanding and will call to schedule the injection with Dr. Briscoe.   Pt was agreeable to the instructions being sent on [employer]. Pt will contact the [facility] if they have additional questions.   Shellie Verdugo LPN

## 2020-08-06 DIAGNOSIS — Z11.59 ENCOUNTER FOR SCREENING FOR OTHER VIRAL DISEASES: Primary | ICD-10-CM

## 2020-08-18 DIAGNOSIS — Z11.59 ENCOUNTER FOR SCREENING FOR OTHER VIRAL DISEASES: Primary | ICD-10-CM

## 2020-08-21 DIAGNOSIS — M54.2 NECK PAIN: ICD-10-CM

## 2020-08-21 RX ORDER — GABAPENTIN 100 MG/1
100 CAPSULE ORAL 3 TIMES DAILY PRN
Qty: 90 CAPSULE | Refills: 1 | Status: SHIPPED | OUTPATIENT
Start: 2020-08-21 | End: 2020-12-02

## 2020-09-22 DIAGNOSIS — Z11.59 ENCOUNTER FOR SCREENING FOR OTHER VIRAL DISEASES: ICD-10-CM

## 2020-09-22 LAB
SARS-COV-2 RNA SPEC QL NAA+PROBE: NOT DETECTED
SPECIMEN SOURCE: NORMAL

## 2020-09-22 PROCEDURE — U0003 INFECTIOUS AGENT DETECTION BY NUCLEIC ACID (DNA OR RNA); SEVERE ACUTE RESPIRATORY SYNDROME CORONAVIRUS 2 (SARS-COV-2) (CORONAVIRUS DISEASE [COVID-19]), AMPLIFIED PROBE TECHNIQUE, MAKING USE OF HIGH THROUGHPUT TECHNOLOGIES AS DESCRIBED BY CMS-2020-01-R: HCPCS | Performed by: ANESTHESIOLOGY

## 2020-09-24 ENCOUNTER — DOCUMENTATION ONLY (OUTPATIENT)
Dept: ANESTHESIOLOGY | Facility: CLINIC | Age: 70
End: 2020-09-24

## 2020-09-24 NOTE — PROGRESS NOTES
Purpose of call: Follow up after  Right Cervical 3, cervical 4, cervical 5, and cervical 6 medial branch nerve diagnostic blocks   Date of service: 6/26/20  Spoke with: Pain diary     Location of pain: neck   Percentage of pain relief after procedure: 100%  Duration of pain relief: 3 hours    Follow up: Pt scheduled for second block 9/25/20    KAT Smallwood, RN

## 2020-09-25 ENCOUNTER — HOSPITAL ENCOUNTER (OUTPATIENT)
Facility: AMBULATORY SURGERY CENTER | Age: 70
End: 2020-09-25
Attending: ANESTHESIOLOGY
Payer: MEDICARE

## 2020-09-25 ENCOUNTER — ANCILLARY PROCEDURE (OUTPATIENT)
Dept: RADIOLOGY | Facility: AMBULATORY SURGERY CENTER | Age: 70
End: 2020-09-25
Attending: ANESTHESIOLOGY
Payer: MEDICARE

## 2020-09-25 ENCOUNTER — SURGERY (OUTPATIENT)
Age: 70
End: 2020-09-25
Payer: MEDICARE

## 2020-09-25 VITALS
BODY MASS INDEX: 23.92 KG/M2 | RESPIRATION RATE: 16 BRPM | OXYGEN SATURATION: 95 % | SYSTOLIC BLOOD PRESSURE: 139 MMHG | HEIGHT: 62 IN | WEIGHT: 130 LBS | TEMPERATURE: 98.2 F | DIASTOLIC BLOOD PRESSURE: 82 MMHG | HEART RATE: 89 BPM

## 2020-09-25 DIAGNOSIS — M47.812 CERVICAL SPONDYLOSIS: ICD-10-CM

## 2020-09-25 RX ORDER — BUPIVACAINE HCL/PF 7.5 MG/ML
AMPUL (ML) INJECTION PRN
Status: DISCONTINUED | OUTPATIENT
Start: 2020-09-25 | End: 2020-09-25 | Stop reason: HOSPADM

## 2020-09-25 RX ORDER — DIAZEPAM 5 MG
5 TABLET ORAL ONCE
Status: COMPLETED | OUTPATIENT
Start: 2020-09-25 | End: 2020-09-25

## 2020-09-25 RX ORDER — IOPAMIDOL 408 MG/ML
INJECTION, SOLUTION INTRATHECAL PRN
Status: DISCONTINUED | OUTPATIENT
Start: 2020-09-25 | End: 2020-09-25 | Stop reason: HOSPADM

## 2020-09-25 RX ADMIN — Medication 2 ML: at 10:39

## 2020-09-25 RX ADMIN — IOPAMIDOL 0.5 ML: 408 INJECTION, SOLUTION INTRATHECAL at 10:39

## 2020-09-25 RX ADMIN — DIAZEPAM 5 MG: 5 TABLET ORAL at 09:35

## 2020-09-25 ASSESSMENT — MIFFLIN-ST. JEOR: SCORE: 1052.93

## 2020-09-25 NOTE — DISCHARGE INSTRUCTIONS
Home Care Instructions after a Medial Branch Block      In a medial branch block, a local anesthetic (numbing medicine) is injected near the medial branch nerve. This stops the transmission of pain signals from the facet joint. If this reduces your pain and improves your mobility, it may tell the doctor which facet joint is causing the pain. This procedure is a diagnostic procedure and is typically short lasting. With this injection, a steroid to increase the longevity of the blocks effect may or may not be used.    Activity  -You may resume most normal activity levels with the exception of strenuous activity. It is important for us to know if your pain with normal activity is relieved after this injection.  -DO NOT shower for 24 hours  -DO NOT remove bandaid for 24 hours    Pain  -You may experience soreness at the injection site for one or two days  -You may use an ice pack for 20 minutes every 2 hours for the first 24 hours  -You may use a heating pad after the first 24 hours  -You may use Tylenol (acetaminophen) every 4 hours or other pain medicines as     directed by your physician    You may experience numbness radiating into your legs or arms (depending on the procedure location). This numbness may last several hours. Until sensation returns to normal; please use caution in walking, climbing stairs, and stepping out of your vehicle, etc.    DID YOU RECEIVE SEDATION TODAY?  Yes    If you received sedation please follow these additional safety measures.  Sedation medicine, if given, may remain active for many hours. It is important for the next 24 hours that you do not:  -Drive a car  -Operate machines or power tools  -Consume alcohol, including beer  -Sign any important papers or legal documents    DID YOU RECEIVE STEROIDS TODAY?  No    PLEASE KEEP TRACK OF YOUR SYMPTOMS AND NOTE YOUR IMPROVEMENT FOR YOUR DOCTOR.       Fill out the pain diary and fax it back to us. This cues us to call the patient to follow up  with scheduling the next procedure. They do not need to call us they have faxed the Pain Diary.     If they do not have access to a fax machine, they can attach it to [email or web address] and we will follow up with them. They do not need to call us.     If they cannot fax or My[employer], then call our call center and request to speak with a nurse for follow up after a procedure [phone].    Please contact us if you have:  -Severe pain  -Fever more than 101.5 degrees Fahrenheit  -Signs of infection at the injection site (redness, swelling, or drainage)    If you have questions, please contact our office at [phone] between the hours of 7:00 am and 3:00 pm Monday through Friday. After office hours you can contact the on call provider by dialing [phone]. If you need immediate attention, we recommend that you go to a hospital emergency room or dial [phone].  If you have scheduling questions please call [phone].

## 2020-09-27 NOTE — PROCEDURES
Patient: Loli Roberson Age: 70 year old   MRN: [id number] Attending: Dr. Arevalo     Date of Visit: September 25, 2020    PAIN MEDICINE CLINIC PROCEDURE NOTE    ATTENDING CLINICIAN:    Ned Arevalo MD    ASSISTANT CLINICIAN:  Karan Wallace DO    PREPROCEDURE DIAGNOSES:  1.  Right Cervical facet arthropathy   2.  Cervicalgia  3.  Neck pain       POSTPROCEDURE DIAGNOSES:  1.  Right Cervical facet arthropathy   2.  Cervicalgia  3.  Neck pain       PROCEDURE(S) PERFORMED:  1.  Right C3, C4, C5 and C6  2.  Fluoroscopic guidance for the above procedures    ANESTHESIA:  Local.    COMPLICATIONS:  None.    MEDICATIONS ADMINISTERED:  Bupivacaine 0.75% 2mL (0.5 mL/level)  Contrast 0.5 mL  (9.5 mL wasted)    INDICATIONS:  Loli Roberson is a 70 year old female with a history of .  The patient stated that she was in her usual state of health and denied recent anticoagulant use or recent infections.  Therefore, the plan is to perform above mentioned procedures.     Procedure Details:  Written informed consent was obtained and saved in the electronic medical record, after the risks, benefits, and alternatives were discussed with the patient.  All of the patient s questions were answered.  The patient was brought to the procedure room, where she was identified by name, medical record number and date of birth.      The patient was placed in the prone position on the procedure room table.  All pressure points were checked and comfortably padded.  Routine monitors were placed.  Vital signs were stable.  A formal time-out procedure was performed, as per protocol, including patient name, title of procedure, and site of procedure, and all in the room concurred.    A chlorhexidine prep was completed followed by sterile draping per standard procedure.    Using C-arm lateral fluoroscopy we identified the C3, C4, C5 and C6 articular pillars on the right. The skin overlying the center of these articular pillars were marked and the skin  and subcutaneous tissues were then anesthetized with 1% lidocaine using a 1-1/2 inch 25-gauge needle.  We then introduced a 25-gauge 3.5 inch spinal needles under intermittent fluoroscopic guidance in a coaxial orientation at each of the above listed articular pillars until periosteum was encountered.  Final needle position was at center of the trapezoid of the target facet on lateral radiographs and the middle of waist of articular pillars in AP radiographs.  After negative aspiration, the above listed contrast medication was injected to verify appropriate spread and the absence of vascular uptake.   No parasthesia was elicited with needle placement.  Aspiration was negative for CSF or blood. Then, 0.4 mL of the above listed injectate was injected.  A contralateral procedure WAS NOT performed.    Light pressure was held at the puncture site(s) to prevent ecchymosis and oozing.  The patient's skin was cleansed, and hemostasis was confirmed.  Band-aids were applied to the needle injection site(s).      Condition:    The patient remained awake and alert throughout the procedure.  The patient tolerated the procedure well and was monitored for approximately 15 minutes afterward in the post procedure area.  There were no immediate post procedure complications noted.  The patient was then discharged to home as per protocol.  The patient will follow up in the outpatient clinic in 4 week(s), unless otherwise clinically indicated.    Patient condition  stable.    Pre-procedure pain score: 8/10  Post-procedure pain score: 0/10    Ned Arevalo MD    Department of Anesthesiology  Pain Management Division

## 2020-09-27 NOTE — H&P
"Abbreviated History and Physical    Patient Name: Loli Roberson  YOB: 1950    Reason for Procedure: Cervical spondylosis    History: Loli Roberson is a 70 year old year old female who presents today for right cervical 3, cervical 4, cervical 5, and cervical 6 medial branch nerve blocks.  She has a history of cervical spondylosis resistant to conservative treatments for which the aforementioned procedure will likely provide significant diagnostic value.  We discussed the procedure at length, including the risks, benefits, and alternatives, and she wishes to proceed with the procedure.  She denies any recent anticoagulant use, recent steroid exposure, recent or upcoming surgeries, or recent signs of infection or systemic illness.    Past Medical History:   Diagnosis Date     Hypertension        History of obstructive sleep apnea? NA    History of problems with sedation? NA    Physical exam:  /82   Pulse 89   Temp 98.2  F (36.8  C) (Temporal)   Resp 16   Ht 1.575 m (5' 2\")   Wt 59 kg (130 lb)   SpO2 95%   BMI 23.78 kg/m    General: in no apparent distress   Neuro: At least antigravity strength noted in all 4 extremities  Respiratory: Clear to ausculation bilaterally   Cardiac: Regular rate and rhythm  Skin: No rashes or lesions on exposed areas of skin    Medications:   Current Outpatient Medications   Medication     atorvastatin (LIPITOR) 10 MG tablet     fish oil-omega-3 fatty acids 1000 MG capsule     gabapentin (NEURONTIN) 100 MG capsule     hydrochlorothiazide (HYDRODIURIL) 25 MG tablet     Multiple Vitamins-Minerals (MULTIVITAMIN WOMEN) TABS     potassium chloride ER (K-TAB/KLOR-CON) 10 MEQ CR tablet     diclofenac (VOLTAREN) 1 % topical gel     methocarbamol (ROBAXIN) 500 MG tablet     No current facility-administered medications for this encounter.        Allergies:   No Known Allergies    ASA Classification: 2    OK for local anesthetic use.     Ned Arevalo MD  Assistant " Professor  Department of Anesthesiology  Pain Management Division

## 2020-09-29 ENCOUNTER — DOCUMENTATION ONLY (OUTPATIENT)
Dept: ANESTHESIOLOGY | Facility: CLINIC | Age: 70
End: 2020-09-29

## 2020-09-29 DIAGNOSIS — M47.812 CERVICAL SPONDYLOSIS: Primary | ICD-10-CM

## 2020-09-29 RX ORDER — LIDOCAINE 40 MG/G
CREAM TOPICAL
Status: CANCELLED | OUTPATIENT
Start: 2020-09-29

## 2020-09-29 NOTE — PROGRESS NOTES
Purpose of call: Follow up after Right Cervical 3, cervical 4, cervical 5, and cervical 6 medial branch nerve diagnostic blocks #2   Date of service: 9/25/20  Spoke with: Pain diary     Location of pain: Neck  Percentage of pain relief after procedure: 100%  Duration of pain relief: 9 hours    Follow up: Pt would like to proceed with RFA. Dr. Briscoe updated with results.     ELVIN SmallwoodN, RN

## 2020-10-05 DIAGNOSIS — Z11.59 ENCOUNTER FOR SCREENING FOR OTHER VIRAL DISEASES: Primary | ICD-10-CM

## 2020-10-20 DIAGNOSIS — Z11.59 ENCOUNTER FOR SCREENING FOR OTHER VIRAL DISEASES: ICD-10-CM

## 2020-10-20 PROCEDURE — U0003 INFECTIOUS AGENT DETECTION BY NUCLEIC ACID (DNA OR RNA); SEVERE ACUTE RESPIRATORY SYNDROME CORONAVIRUS 2 (SARS-COV-2) (CORONAVIRUS DISEASE [COVID-19]), AMPLIFIED PROBE TECHNIQUE, MAKING USE OF HIGH THROUGHPUT TECHNOLOGIES AS DESCRIBED BY CMS-2020-01-R: HCPCS | Performed by: ANESTHESIOLOGY

## 2020-10-21 LAB
SARS-COV-2 RNA SPEC QL NAA+PROBE: NOT DETECTED
SPECIMEN SOURCE: NORMAL

## 2020-10-23 ENCOUNTER — ANCILLARY PROCEDURE (OUTPATIENT)
Dept: RADIOLOGY | Facility: AMBULATORY SURGERY CENTER | Age: 70
End: 2020-10-23
Attending: ANESTHESIOLOGY
Payer: MEDICARE

## 2020-10-23 ENCOUNTER — HOSPITAL ENCOUNTER (OUTPATIENT)
Facility: AMBULATORY SURGERY CENTER | Age: 70
Discharge: HOME | End: 2020-10-23
Attending: ANESTHESIOLOGY | Admitting: ANESTHESIOLOGY
Payer: MEDICARE

## 2020-10-23 VITALS
WEIGHT: 130 LBS | BODY MASS INDEX: 23.92 KG/M2 | DIASTOLIC BLOOD PRESSURE: 66 MMHG | HEART RATE: 78 BPM | OXYGEN SATURATION: 95 % | HEIGHT: 62 IN | SYSTOLIC BLOOD PRESSURE: 122 MMHG | RESPIRATION RATE: 14 BRPM | TEMPERATURE: 97.6 F

## 2020-10-23 DIAGNOSIS — M47.812 CERVICAL SPONDYLOSIS: ICD-10-CM

## 2020-10-23 PROCEDURE — 64633 DESTROY CERV/THOR FACET JNT: CPT | Mod: RT

## 2020-10-23 RX ORDER — LIDOCAINE HYDROCHLORIDE 40 MG/ML
INJECTION, SOLUTION RETROBULBAR PRN
Status: DISCONTINUED | OUTPATIENT
Start: 2020-10-23 | End: 2020-10-23 | Stop reason: HOSPADM

## 2020-10-23 RX ORDER — BUPIVACAINE HYDROCHLORIDE 5 MG/ML
INJECTION, SOLUTION PERINEURAL PRN
Status: DISCONTINUED | OUTPATIENT
Start: 2020-10-23 | End: 2020-10-23 | Stop reason: HOSPADM

## 2020-10-23 RX ORDER — FENTANYL CITRATE 50 UG/ML
INJECTION, SOLUTION INTRAMUSCULAR; INTRAVENOUS PRN
Status: DISCONTINUED | OUTPATIENT
Start: 2020-10-23 | End: 2020-10-23 | Stop reason: HOSPADM

## 2020-10-23 RX ORDER — LIDOCAINE HYDROCHLORIDE 10 MG/ML
INJECTION, SOLUTION EPIDURAL; INFILTRATION; INTRACAUDAL; PERINEURAL PRN
Status: DISCONTINUED | OUTPATIENT
Start: 2020-10-23 | End: 2020-10-23 | Stop reason: HOSPADM

## 2020-10-23 RX ORDER — LIDOCAINE 40 MG/G
CREAM TOPICAL
Status: DISCONTINUED | OUTPATIENT
Start: 2020-10-23 | End: 2020-10-24 | Stop reason: HOSPADM

## 2020-10-23 ASSESSMENT — MIFFLIN-ST. JEOR: SCORE: 1052.93

## 2020-10-23 NOTE — PROCEDURES
Patient: Loli Roberson Age: 70 year old   MRN: [id number] Attending: Dr. Arevalo     Date of Visit: October 23, 2020    PAIN MEDICINE CLINIC PROCEDURE NOTE    ATTENDING CLINICIAN:    Ned Arevalo MD    ASSISTANT CLINICIAN:  JAY Simpson    PREPROCEDURE DIAGNOSES:  1.  Right  Cervical facet arthropathy   2.  Cervicalgia  3.  Neck pain       POSTPROCEDURE DIAGNOSES:  1.  Right  Cervical facet arthropathy   2.  Cervicalgia  3.  Neck pain       PROCEDURE(S) PERFORMED:  1.  Cervical 3, Cervical 4, Cervical 5 and Cervical 6 medial branch nerve radiofrequency ablation   2.  Fluoroscopic guidance for the above procedures    ANESTHESIA:  Local.    COMPLICATIONS:  None.    MEDICATIONS ADMINISTERED:  Bupivacaine 0.5% 2 mL (0.5 mL/site)  Lidocaine 4% 2 mL (0.5 mL/site)    INDICATIONS:  Loli Roberson is a 70 year old female with a history of cervical facet arthropathy. She experienced significant pain relief with return of pain in an expected time frame following two diagnostic blocks.  The patient stated that she was in her usual state of health and denied recent anticoagulant use or recent infections.  Therefore, the plan is to perform above mentioned procedures.     Procedure Details:  Written informed consent was obtained and saved in the electronic medical record, after the risks, benefits, and alternatives were discussed with the patient.  All of the patient s questions were answered.  The patient was brought to the procedure room, where she was identified by name, medical record number and date of birth.      The patient was placed in the prone position on the procedure room table.  All pressure points were checked and comfortably padded.  Routine monitors were placed.  Vital signs were stable.  A formal time-out procedure was performed, as per protocol, including patient name, title of procedure, and site of procedure, and all in the room concurred.    A chlorhexidine prep was completed followed by sterile  draping per standard procedure.    Using C-arm AP fluoroscopy we identified the C3, C4, C5 and C6 articular pillars on the right side.  Using a 25G 1.5 inch needle, the skin was anesthetized over the planned insertion sites.  Then a 100 mm radiofrequency cannula with 10 mm active tip was advanced under intermittent fluoroscopic guidance percutaneously from a posterior and inferior approach until the needle tip contacted the lateral aspect of the articular pillar of the C4 vertebrae. This was confirmed in lateral fluoroscopic view and the needle was then advanced to the center of the C4 articular pillar along the course of the medial branch nerve.  We then repeated the same procedure for placement of needles at the C3, C5 and C6 levels.  The final needle position was verified at the mid articular pillar and closely adjacent to the articular pillar in AP and lateral views    Sensory stimulation was performed at each level sequentially at 50 Hz with stimulation elicited at each level at or below 0.5 V.  After sufficient sensory stimulation was noted at each level, motor stimulation was performed sequentially at each level at 2 Hz up to 1.5 V.  At each level, paraspinal muscle twitching was noted, but there was not stimulation noted or reported by the patient into the face, shoulder, or arm.  Once this was confirmed at all levels, the above listed Lidocaine 2% was divided among the listed levels and the probes were replaced.  After allowing 2-3 minutes for the lidocaine to take effect, the probes were then heated to 80C for 90 seconds. The needles were then rotated 180 degrees and again heated to 80C for 90 seconds.    After RF ablation, we injected the above listed bupivacaine divided evenly among the listed sites.  The needles were removed. The skin was cleaned and band-aids were placed.  Light pressure was held at the puncture site(s) to prevent ecchymosis and oozing.  The patient's skin was cleansed, and hemostasis  was confirmed.  Band-aids were applied to the needle injection site(s).      Condition:    The patient remained awake and alert throughout the procedure.  The patient tolerated the procedure well and was monitored for approximately 15 minutes afterward in the post procedure area.  There were no immediate post procedure complications noted.  The patient was then discharged to home as per protocol.  The patient will follow up in the outpatient clinic in 4 week(s), unless otherwise clinically indicated.    Patient condition  stable.    Pre-procedure pain score: 7/10  Post-procedure pain score: 0/10        Sedation:      Performed by: Ned Briscoe MD    Indication:  Sedation is required to allow above mentioned procedure     Consent:  Consent obtained from the patient Loli Roberson after discussing the risks, benefits and alternatives.    PO Intake:  Appropriately NPO for procedure    Lungs: Lungs Clear with good breath sounds bilaterally.     Heart: Normal heart sounds and rate    Focused history and physical completed prior to procedure. I have reviewed the lab findings, diagnostic data, medications, and the plan for sedation. I have determined this patient to be an appropriate candidate for the planned sedation and procedure and have reassessed the patient IMMEDIATELY PRIOR to sedation and procedure.      Sedation Post Procedure Summary:    Prior to the start of the procedure and with procedural staff participation, I verbally confirmed the patient s identity using two indicators, relevant allergies, that the procedure was appropriate and matched the consent or emergent situation, and that the correct equipment/implants were available. Immediately prior to starting the procedure I conducted the Time Out with the procedural staff and re-confirmed the patient s name, procedure, and site/side. (The Joint Commission universal protocol was followed.)  Yes      Sedatives: Fentanyl and Midazolam (Versed)    Vital  signs, airway, End Tidal CO2 and pulse oximetry were monitored and remained stable throughout the procedure and sedation was maintained until the procedure was complete.  The patient was monitored by staff until sedation discharge criteria were met.    Patient tolerance: Patient tolerated the procedure well with no immediate complications.    Time of sedation in minutes:  35 minutes from beginning to end of physician one to one monitoring.    Ned Arevalo MD    Department of Anesthesiology  Pain Management Division

## 2020-10-23 NOTE — H&P
Abbreviated History and Physical    Patient Name: Loli Roberson  YOB: 1950    Reason for Procedure:     History: Loli Roberson is a 70 year old year old female who presents today for right cervical 3, 4, 5, and 6 medial branch nerve radiofrequency ablation.  She has a history of right sided cervical spondylosis with positive response to diagnostic nerve blocks x 2 for which the aforementioned procedure will likely provide significant therapeutic value.  We discussed the procedure at length, including the risks, benefits, and alternatives, and she wishes to proceed with the procedure.  She denies any recent anticoagulant use, recent steroid exposure, recent or upcoming surgeries, or recent signs of infection or systemic illness.    Past Medical History:   Diagnosis Date     Hypertension        History of obstructive sleep apnea? Denies    History of problems with sedation? Denies    Physical exam:  There were no vitals taken for this visit.  General: in no apparent distress   Neuro: At least antigravity strength noted in all 4 extremities  Respiratory: Clear to ausculation bilaterally   Cardiac: Regular rate and rhythm  Skin: No rashes or lesions on exposed areas of skin    Medications:   Current Outpatient Medications   Medication     atorvastatin (LIPITOR) 10 MG tablet     diclofenac (VOLTAREN) 1 % topical gel     fish oil-omega-3 fatty acids 1000 MG capsule     gabapentin (NEURONTIN) 100 MG capsule     hydrochlorothiazide (HYDRODIURIL) 25 MG tablet     methocarbamol (ROBAXIN) 500 MG tablet     Multiple Vitamins-Minerals (MULTIVITAMIN WOMEN) TABS     potassium chloride ER (K-TAB/KLOR-CON) 10 MEQ CR tablet     No current facility-administered medications for this encounter.        Allergies:   No Known Allergies    ASA Classification: 2    OK for moderate sedation.     Ned Arevalo MD    Department of Anesthesiology  Pain Management Division

## 2020-10-23 NOTE — DISCHARGE INSTRUCTIONS
Home Care Instructions after a Radio Frequency Ablation      Activity  -Rest today  -Do not work today  -Resume normal activity in 2-3 days  -No heavy lifting, turning or twisting for 24 hours  -DO NOT shower or soak in tub for 24 hours  -DO NOT remove bandaid for 24 hours    Pain  -You may experience soreness at the injection site for one or two days  -You may use an ice pack for 20 minutes every 2 hours for the first 24 hours, longer if needed for comfort, do not use heat  -You may use Tylenol (acetaminophen) every 4 hours or other pain medicines as directed by your physician  -If other pain medications are prescribed by your physician, please follow dosing instructions carefully.    What to expect  You may experience numbness radiating into your legs or arms (depending on the procedure location). This numbness may last several hours. Until sensation returns to normal, please use caution in walking, climbing stairs, and stepping out of your vehicle, etc. Relief of your initial symptoms can take up to 4 weeks to feel better, this is normal and due to the healing process. The procedure site may feel like a deep burn. Using ice will greatly minimize this discomfort. Do not use numbing creams or patches over your injection sites immediately following your procedure. Please keep injection sites covered, clean and dry for 24 hours.    DID YOU RECEIVE SEDATION TODAY?  Yes    Safety  Sedation medicine, if given, may remain active for many hours. It is important for the next 24 hours that you do not:  -Drive a car  -Operate machines or power tools  -Consume alcohol, including beer  -Sign any important papers or legal documents  -Please have a responsible adult with you for 24 hours following any sedation    DID YOU RECEIVE STEROIDS TODAY?  Yes    Common side effects of steroids:  Not everyone will experience corticosteroid side effects. If side effects are experienced, they will gradually subside in the 7-10 day period  following an injection. Most common side effects include:  -Flushed face and/or chest  -Feeling of warmth, particularly in the face but could be an overall feeling of warmth  -Increased blood sugar in diabetic patients  -Menstrual irregularities my occur. If taking hormone-based birth control an alternate method of birth control is recommended  -Sleep disturbances and/or mood swings are possible  -Leg cramps      Please contact us if you have:  -Severe pain  -Fever more than 101.5 degrees Fahrenheit  -Signs of infection at the injection site (redness, swelling, or drainage)    If you have questions, please contact our office at [phone] between the hours of 7:00 am and 3:00 pm Monday through Friday. After office hours you can contact the on call provider by dialing [phone]. If you need immediate attention, we recommend that you go to a hospital emergency room or dial [phone].   If you have scheduling questions please call [phone].

## 2020-11-06 ENCOUNTER — TELEPHONE (OUTPATIENT)
Dept: ANESTHESIOLOGY | Facility: CLINIC | Age: 70
End: 2020-11-06

## 2020-11-06 DIAGNOSIS — M79.18 MYOFASCIAL PAIN: ICD-10-CM

## 2020-11-06 RX ORDER — METHOCARBAMOL 500 MG/1
500 TABLET, FILM COATED ORAL
Qty: 30 TABLET | Refills: 2 | Status: SHIPPED | OUTPATIENT
Start: 2020-11-06

## 2020-11-30 ENCOUNTER — TELEPHONE (OUTPATIENT)
Dept: ANESTHESIOLOGY | Facility: CLINIC | Age: 70
End: 2020-11-30

## 2020-11-30 DIAGNOSIS — M54.2 NECK PAIN: ICD-10-CM

## 2020-11-30 NOTE — TELEPHONE ENCOUNTER
Health Call Center    Phone Message    May a detailed message be left on voicemail: yes     Reason for Call: Medication Question or concern regarding medication   Prescription Clarification  Name of Medication: gabapentin (NEURONTIN) 100 MG capsule  Prescribing Provider: Ned Briscoe   Pharmacy: [facility], [address] [place] [id number]   What on the order needs clarification? Patient is requesting for a Prior Authorization to be sent to this pharmacy for this medication. Patient states she is switching pharmacies and that the [facility] should've sent over a fax for this medication. Please review and call patient back to advise.       Action Taken: Message routed to:  [facility] PAIN    Travel Screening: Not Applicable

## 2020-11-30 NOTE — TELEPHONE ENCOUNTER
I called and spoke with the pt and informed her that I started the PA process for this medication and if it is approved our PA team will notify her pharmacy.    Pt stated verbal understanding and had no further questions or concerns.    Rubina Stephens, MINGO

## 2020-11-30 NOTE — TELEPHONE ENCOUNTER
Prior Authorization Retail Medication Request    Medication/Dose: gabapentin (NEURONTIN) 100 MG capsule  ICD code (if different than what is on RX):  Previously Tried and Failed:    Rationale:      Insurance Name:    Insurance ID:        Pharmacy Information (if different than what is on RX)  Name:   Phone:

## 2020-11-30 NOTE — TELEPHONE ENCOUNTER
Central Prior Authorization Team   Phone: [phone]      PA Initiation    Medication: gabapentin (NEURONTIN) 100 MG capsule  Insurance Company: [employer] - Phone [phone] Fax [phone]  Pharmacy Filling the Rx: [facility] [address] [place] [address]  Filling Pharmacy Phone: [phone]  Filling Pharmacy Fax:    Start Date: 11/30/2020

## 2020-12-02 RX ORDER — GABAPENTIN 100 MG/1
100 CAPSULE ORAL 3 TIMES DAILY PRN
Qty: 90 CAPSULE | Refills: 1 | Status: SHIPPED | OUTPATIENT
Start: 2020-12-02

## 2020-12-02 NOTE — TELEPHONE ENCOUNTER
Prior Authorization Not Needed per Insurance    Medication: gabapentin (NEURONTIN) 100 MG capsule- PA Not Needed  Insurance Company: [employer] - Phone [phone] Fax [phone]  Expected CoPay:      Pharmacy Filling the Rx: [facility] [address] [place] [address] [address] [place] [address]  Pharmacy Notified: Yes  Patient Notified: No    Per pharmacy, they do not have any refills.

## 2020-12-02 NOTE — TELEPHONE ENCOUNTER
Health Call Center    Phone Message    May a detailed message be left on voicemail: yes     Reason for Call: Medication Refill Request    Has the patient contacted the pharmacy for the refill? Yes   Name of medication being requested: gabapentin (NEURONTIN) 100 MG capsule  Provider who prescribed the medication: Ned Briscoe  Pharmacy: [facility], [address] [place] [id number]  Date medication is needed: 12/02/2020, Patient would like a call back with and update on her refill request for this medication. Please call patient back to advise      Action Taken: Message routed to:  [facility] PAIN    Travel Screening: Not Applicable

## 2021-01-04 ENCOUNTER — HEALTH MAINTENANCE LETTER (OUTPATIENT)
Age: 71
End: 2021-01-04

## 2021-04-25 ENCOUNTER — HEALTH MAINTENANCE LETTER (OUTPATIENT)
Age: 71
End: 2021-04-25

## 2021-10-10 ENCOUNTER — HEALTH MAINTENANCE LETTER (OUTPATIENT)
Age: 71
End: 2021-10-10

## 2022-05-22 ENCOUNTER — HEALTH MAINTENANCE LETTER (OUTPATIENT)
Age: 72
End: 2022-05-22

## 2022-09-18 ENCOUNTER — HEALTH MAINTENANCE LETTER (OUTPATIENT)
Age: 72
End: 2022-09-18

## 2023-01-29 ENCOUNTER — HEALTH MAINTENANCE LETTER (OUTPATIENT)
Age: 73
End: 2023-01-29

## 2023-06-04 ENCOUNTER — HEALTH MAINTENANCE LETTER (OUTPATIENT)
Age: 73
End: 2023-06-04
